# Patient Record
Sex: MALE | Race: WHITE | NOT HISPANIC OR LATINO | URBAN - METROPOLITAN AREA
[De-identification: names, ages, dates, MRNs, and addresses within clinical notes are randomized per-mention and may not be internally consistent; named-entity substitution may affect disease eponyms.]

---

## 2017-02-15 ENCOUNTER — OFFICE VISIT - RIVER FALLS (OUTPATIENT)
Dept: FAMILY MEDICINE | Facility: CLINIC | Age: 67
End: 2017-02-15

## 2017-02-15 ASSESSMENT — MIFFLIN-ST. JEOR: SCORE: 1888.23

## 2017-02-16 ENCOUNTER — OFFICE VISIT - RIVER FALLS (OUTPATIENT)
Dept: FAMILY MEDICINE | Facility: CLINIC | Age: 67
End: 2017-02-16

## 2017-03-17 ENCOUNTER — OFFICE VISIT - RIVER FALLS (OUTPATIENT)
Dept: FAMILY MEDICINE | Facility: CLINIC | Age: 67
End: 2017-03-17

## 2017-03-17 ASSESSMENT — MIFFLIN-ST. JEOR: SCORE: 1888.23

## 2017-04-04 ENCOUNTER — OFFICE VISIT - RIVER FALLS (OUTPATIENT)
Dept: FAMILY MEDICINE | Facility: CLINIC | Age: 67
End: 2017-04-04

## 2017-04-04 ASSESSMENT — MIFFLIN-ST. JEOR: SCORE: 1892.77

## 2017-05-09 ENCOUNTER — COMMUNICATION - RIVER FALLS (OUTPATIENT)
Dept: FAMILY MEDICINE | Facility: CLINIC | Age: 67
End: 2017-05-09

## 2017-05-09 ENCOUNTER — OFFICE VISIT - RIVER FALLS (OUTPATIENT)
Dept: FAMILY MEDICINE | Facility: CLINIC | Age: 67
End: 2017-05-09

## 2017-05-09 ASSESSMENT — MIFFLIN-ST. JEOR: SCORE: 1861.02

## 2017-05-10 LAB
CREAT SERPL-MCNC: 0.91 MG/DL (ref 0.7–1.25)
GLUCOSE BLD-MCNC: 145 MG/DL (ref 65–99)

## 2017-05-22 ENCOUNTER — OFFICE VISIT - RIVER FALLS (OUTPATIENT)
Dept: FAMILY MEDICINE | Facility: CLINIC | Age: 67
End: 2017-05-22

## 2017-05-22 ASSESSMENT — MIFFLIN-ST. JEOR: SCORE: 1861.02

## 2017-06-20 ENCOUNTER — COMMUNICATION - RIVER FALLS (OUTPATIENT)
Dept: FAMILY MEDICINE | Facility: CLINIC | Age: 67
End: 2017-06-20

## 2017-06-20 ENCOUNTER — OFFICE VISIT - RIVER FALLS (OUTPATIENT)
Dept: FAMILY MEDICINE | Facility: CLINIC | Age: 67
End: 2017-06-20

## 2017-06-20 ASSESSMENT — MIFFLIN-ST. JEOR: SCORE: 1847.41

## 2017-06-21 LAB — HBA1C MFR BLD: 6.9 %

## 2018-03-22 ENCOUNTER — OFFICE VISIT - RIVER FALLS (OUTPATIENT)
Dept: FAMILY MEDICINE | Facility: CLINIC | Age: 68
End: 2018-03-22
Payer: MEDICARE

## 2018-03-22 ASSESSMENT — MIFFLIN-ST. JEOR: SCORE: 1870.09

## 2018-03-23 LAB
CHOLEST SERPL-MCNC: 200 MG/DL
CHOLEST/HDLC SERPL: 7.1 {RATIO}
CREAT SERPL-MCNC: 0.97 MG/DL (ref 0.7–1.25)
GLUCOSE BLD-MCNC: 104 MG/DL (ref 65–99)
HBA1C MFR BLD: 6.4 %
HDLC SERPL-MCNC: 28 MG/DL
LDLC SERPL CALC-MCNC: 95 MG/DL
LDLC SERPL CALC-MCNC: ABNORMAL MG/DL
NONHDLC SERPL-MCNC: 172 MG/DL
TRIGL SERPL-MCNC: 549 MG/DL

## 2018-10-02 ENCOUNTER — AMBULATORY - RIVER FALLS (OUTPATIENT)
Dept: FAMILY MEDICINE | Facility: CLINIC | Age: 68
End: 2018-10-02
Payer: MEDICARE

## 2018-10-03 LAB — HBA1C MFR BLD: 6.9 %

## 2018-10-09 ENCOUNTER — OFFICE VISIT - RIVER FALLS (OUTPATIENT)
Dept: FAMILY MEDICINE | Facility: CLINIC | Age: 68
End: 2018-10-09
Payer: MEDICARE

## 2018-11-13 ENCOUNTER — OFFICE VISIT - RIVER FALLS (OUTPATIENT)
Dept: FAMILY MEDICINE | Facility: CLINIC | Age: 68
End: 2018-11-13
Payer: MEDICARE

## 2018-11-13 ASSESSMENT — MIFFLIN-ST. JEOR: SCORE: 1865.55

## 2018-11-14 LAB — PSA SERPL-MCNC: 2.5 NG/ML

## 2018-11-21 ENCOUNTER — AMBULATORY - RIVER FALLS (OUTPATIENT)
Dept: FAMILY MEDICINE | Facility: CLINIC | Age: 68
End: 2018-11-21
Payer: MEDICARE

## 2019-05-13 ENCOUNTER — AMBULATORY - RIVER FALLS (OUTPATIENT)
Dept: FAMILY MEDICINE | Facility: CLINIC | Age: 69
End: 2019-05-13
Payer: MEDICARE

## 2019-05-14 ENCOUNTER — COMMUNICATION - RIVER FALLS (OUTPATIENT)
Dept: FAMILY MEDICINE | Facility: CLINIC | Age: 69
End: 2019-05-14
Payer: MEDICARE

## 2019-05-14 LAB
BUN SERPL-MCNC: 21 MG/DL (ref 7–25)
BUN/CREAT RATIO - HISTORICAL: ABNORMAL (ref 6–22)
CALCIUM SERPL-MCNC: 9.6 MG/DL (ref 8.6–10.3)
CHLORIDE BLD-SCNC: 104 MMOL/L (ref 98–110)
CHOLEST SERPL-MCNC: 153 MG/DL
CHOLEST/HDLC SERPL: 4.3 {RATIO}
CO2 SERPL-SCNC: 27 MMOL/L (ref 20–32)
CREAT SERPL-MCNC: 0.91 MG/DL (ref 0.7–1.25)
CREAT UR-MCNC: 103 MG/DL (ref 20–320)
EGFRCR SERPLBLD CKD-EPI 2021: 88 ML/MIN/1.73M2
GLUCOSE BLD-MCNC: 140 MG/DL (ref 65–99)
HBA1C MFR BLD: 7.2 %
HDLC SERPL-MCNC: 36 MG/DL
LDLC SERPL CALC-MCNC: 80 MG/DL
MICROALBUMIN UR-MCNC: 0.3 MG/DL
MICROALBUMIN/CREAT UR: 3 MG/G{CREAT}
NONHDLC SERPL-MCNC: 117 MG/DL
POTASSIUM BLD-SCNC: 4.2 MMOL/L (ref 3.5–5.3)
SODIUM SERPL-SCNC: 140 MMOL/L (ref 135–146)
TRIGL SERPL-MCNC: 280 MG/DL

## 2019-05-22 ENCOUNTER — OFFICE VISIT - RIVER FALLS (OUTPATIENT)
Dept: FAMILY MEDICINE | Facility: CLINIC | Age: 69
End: 2019-05-22
Payer: MEDICARE

## 2019-05-22 ASSESSMENT — MIFFLIN-ST. JEOR: SCORE: 1867.37

## 2019-06-11 ENCOUNTER — OFFICE VISIT - RIVER FALLS (OUTPATIENT)
Dept: FAMILY MEDICINE | Facility: CLINIC | Age: 69
End: 2019-06-11
Payer: MEDICARE

## 2019-06-11 ASSESSMENT — MIFFLIN-ST. JEOR: SCORE: 1861.02

## 2019-10-03 ENCOUNTER — OFFICE VISIT - RIVER FALLS (OUTPATIENT)
Dept: FAMILY MEDICINE | Facility: CLINIC | Age: 69
End: 2019-10-03
Payer: MEDICARE

## 2019-10-04 ENCOUNTER — OFFICE VISIT - RIVER FALLS (OUTPATIENT)
Dept: FAMILY MEDICINE | Facility: CLINIC | Age: 69
End: 2019-10-04
Payer: MEDICARE

## 2019-10-04 ASSESSMENT — MIFFLIN-ST. JEOR: SCORE: 1857.39

## 2019-10-05 LAB — HBA1C MFR BLD: 8.4 %

## 2019-10-06 ENCOUNTER — COMMUNICATION - RIVER FALLS (OUTPATIENT)
Dept: FAMILY MEDICINE | Facility: CLINIC | Age: 69
End: 2019-10-06
Payer: MEDICARE

## 2020-01-16 ENCOUNTER — AMBULATORY - RIVER FALLS (OUTPATIENT)
Dept: FAMILY MEDICINE | Facility: CLINIC | Age: 70
End: 2020-01-16
Payer: MEDICARE

## 2020-01-16 ENCOUNTER — OFFICE VISIT - RIVER FALLS (OUTPATIENT)
Dept: FAMILY MEDICINE | Facility: CLINIC | Age: 70
End: 2020-01-16
Payer: MEDICARE

## 2020-01-16 ASSESSMENT — MIFFLIN-ST. JEOR: SCORE: 1892.77

## 2020-01-17 ENCOUNTER — COMMUNICATION - RIVER FALLS (OUTPATIENT)
Dept: FAMILY MEDICINE | Facility: CLINIC | Age: 70
End: 2020-01-17
Payer: MEDICARE

## 2020-01-17 LAB
HBA1C MFR BLD: 6.8 %
PSA SERPL-MCNC: 3 NG/ML

## 2020-06-04 ENCOUNTER — AMBULATORY - RIVER FALLS (OUTPATIENT)
Dept: FAMILY MEDICINE | Facility: CLINIC | Age: 70
End: 2020-06-04
Payer: MEDICARE

## 2020-06-05 LAB
CREAT UR-MCNC: 147 MG/DL (ref 20–320)
HBA1C MFR BLD: 7.6 %
MICROALBUMIN UR-MCNC: 0.7 MG/DL
MICROALBUMIN/CREAT UR: 5 MG/G{CREAT}

## 2020-06-08 ENCOUNTER — COMMUNICATION - RIVER FALLS (OUTPATIENT)
Dept: FAMILY MEDICINE | Facility: CLINIC | Age: 70
End: 2020-06-08
Payer: MEDICARE

## 2020-07-28 ENCOUNTER — OFFICE VISIT - RIVER FALLS (OUTPATIENT)
Dept: FAMILY MEDICINE | Facility: CLINIC | Age: 70
End: 2020-07-28
Payer: MEDICARE

## 2020-07-28 ASSESSMENT — MIFFLIN-ST. JEOR: SCORE: 1874.62

## 2021-02-25 ENCOUNTER — AMBULATORY - RIVER FALLS (OUTPATIENT)
Dept: FAMILY MEDICINE | Facility: CLINIC | Age: 71
End: 2021-02-25
Payer: MEDICARE

## 2021-02-26 ENCOUNTER — COMMUNICATION - RIVER FALLS (OUTPATIENT)
Dept: FAMILY MEDICINE | Facility: CLINIC | Age: 71
End: 2021-02-26
Payer: MEDICARE

## 2021-02-26 LAB
BUN SERPL-MCNC: 17 MG/DL (ref 7–25)
BUN/CREAT RATIO - HISTORICAL: ABNORMAL (ref 6–22)
CALCIUM SERPL-MCNC: 9.2 MG/DL (ref 8.6–10.3)
CHLORIDE BLD-SCNC: 103 MMOL/L (ref 98–110)
CHOLEST SERPL-MCNC: 133 MG/DL
CHOLEST/HDLC SERPL: 4.2 {RATIO}
CO2 SERPL-SCNC: 27 MMOL/L (ref 20–32)
CREAT SERPL-MCNC: 1.01 MG/DL (ref 0.7–1.25)
EGFRCR SERPLBLD CKD-EPI 2021: 76 ML/MIN/1.73M2
GLUCOSE BLD-MCNC: 180 MG/DL (ref 65–99)
HBA1C MFR BLD: 7.9 %
HDLC SERPL-MCNC: 32 MG/DL
LDLC SERPL CALC-MCNC: 66 MG/DL
NONHDLC SERPL-MCNC: 101 MG/DL
POTASSIUM BLD-SCNC: 4.1 MMOL/L (ref 3.5–5.3)
PSA SERPL-MCNC: 3.2 NG/ML
SODIUM SERPL-SCNC: 139 MMOL/L (ref 135–146)
TRIGL SERPL-MCNC: 263 MG/DL

## 2021-03-04 ENCOUNTER — OFFICE VISIT - RIVER FALLS (OUTPATIENT)
Dept: FAMILY MEDICINE | Facility: CLINIC | Age: 71
End: 2021-03-04
Payer: MEDICARE

## 2021-03-04 ASSESSMENT — MIFFLIN-ST. JEOR: SCORE: 1893.68

## 2021-09-16 ENCOUNTER — OFFICE VISIT - RIVER FALLS (OUTPATIENT)
Dept: FAMILY MEDICINE | Facility: CLINIC | Age: 71
End: 2021-09-16
Payer: MEDICARE

## 2021-09-24 ENCOUNTER — AMBULATORY - RIVER FALLS (OUTPATIENT)
Dept: FAMILY MEDICINE | Facility: CLINIC | Age: 71
End: 2021-09-24
Payer: MEDICARE

## 2021-09-25 LAB
BUN SERPL-MCNC: 18 MG/DL (ref 7–25)
BUN/CREAT RATIO - HISTORICAL: ABNORMAL (ref 6–22)
CALCIUM SERPL-MCNC: 9.7 MG/DL (ref 8.6–10.3)
CHLORIDE BLD-SCNC: 102 MMOL/L (ref 98–110)
CHOLEST SERPL-MCNC: 188 MG/DL
CHOLEST/HDLC SERPL: 5.7 {RATIO}
CO2 SERPL-SCNC: 27 MMOL/L (ref 20–32)
CREAT SERPL-MCNC: 1.09 MG/DL (ref 0.7–1.25)
CREAT UR-MCNC: 145 MG/DL (ref 20–320)
EGFRCR SERPLBLD CKD-EPI 2021: 69 ML/MIN/1.73M2
GLUCOSE BLD-MCNC: 157 MG/DL (ref 65–99)
HBA1C MFR BLD: 7.3 %
HDLC SERPL-MCNC: 33 MG/DL
LDLC SERPL CALC-MCNC: 107 MG/DL
MICROALBUMIN UR-MCNC: 0.4 MG/DL
MICROALBUMIN/CREAT UR: 3 MG/G{CREAT}
NONHDLC SERPL-MCNC: 155 MG/DL
POTASSIUM BLD-SCNC: 4.1 MMOL/L (ref 3.5–5.3)
SARS-COV-2 AB SERPL IA-ACNC: <1 [IU]/ML
SODIUM SERPL-SCNC: 137 MMOL/L (ref 135–146)
TRIGL SERPL-MCNC: 361 MG/DL

## 2021-09-27 ENCOUNTER — COMMUNICATION - RIVER FALLS (OUTPATIENT)
Dept: FAMILY MEDICINE | Facility: CLINIC | Age: 71
End: 2021-09-27
Payer: MEDICARE

## 2021-10-27 ENCOUNTER — AMBULATORY - RIVER FALLS (OUTPATIENT)
Dept: FAMILY MEDICINE | Facility: CLINIC | Age: 71
End: 2021-10-27

## 2021-10-27 ENCOUNTER — LAB REQUISITION (OUTPATIENT)
Dept: LAB | Facility: CLINIC | Age: 71
End: 2021-10-27
Payer: MEDICARE

## 2021-10-27 ENCOUNTER — OFFICE VISIT - RIVER FALLS (OUTPATIENT)
Dept: FAMILY MEDICINE | Facility: CLINIC | Age: 71
End: 2021-10-27

## 2021-10-27 DIAGNOSIS — U07.1 COVID-19: ICD-10-CM

## 2021-10-27 PROCEDURE — U0003 INFECTIOUS AGENT DETECTION BY NUCLEIC ACID (DNA OR RNA); SEVERE ACUTE RESPIRATORY SYNDROME CORONAVIRUS 2 (SARS-COV-2) (CORONAVIRUS DISEASE [COVID-19]), AMPLIFIED PROBE TECHNIQUE, MAKING USE OF HIGH THROUGHPUT TECHNOLOGIES AS DESCRIBED BY CMS-2020-01-R: HCPCS | Mod: ORL | Performed by: PHYSICIAN ASSISTANT

## 2021-10-28 LAB — SARS-COV-2 RNA RESP QL NAA+PROBE: NEGATIVE

## 2021-10-29 LAB — SARS-COV-2 RNA RESP QL NAA+PROBE: NEGATIVE

## 2021-11-02 ENCOUNTER — LAB REQUISITION (OUTPATIENT)
Dept: LAB | Facility: CLINIC | Age: 71
End: 2021-11-02
Payer: MEDICARE

## 2021-11-02 ENCOUNTER — AMBULATORY - RIVER FALLS (OUTPATIENT)
Dept: FAMILY MEDICINE | Facility: CLINIC | Age: 71
End: 2021-11-02

## 2021-11-02 DIAGNOSIS — U07.1 COVID-19: ICD-10-CM

## 2021-11-02 PROCEDURE — U0003 INFECTIOUS AGENT DETECTION BY NUCLEIC ACID (DNA OR RNA); SEVERE ACUTE RESPIRATORY SYNDROME CORONAVIRUS 2 (SARS-COV-2) (CORONAVIRUS DISEASE [COVID-19]), AMPLIFIED PROBE TECHNIQUE, MAKING USE OF HIGH THROUGHPUT TECHNOLOGIES AS DESCRIBED BY CMS-2020-01-R: HCPCS | Mod: ORL | Performed by: FAMILY MEDICINE

## 2021-11-05 ENCOUNTER — COMMUNICATION - RIVER FALLS (OUTPATIENT)
Dept: FAMILY MEDICINE | Facility: CLINIC | Age: 71
End: 2021-11-05
Payer: MEDICARE

## 2021-11-05 LAB — SARS-COV-2 RNA RESP QL NAA+PROBE: POSITIVE

## 2021-11-06 LAB — SARS-COV-2 RNA RESP QL NAA+PROBE: DETECTED

## 2022-02-12 VITALS
WEIGHT: 248 LBS | DIASTOLIC BLOOD PRESSURE: 92 MMHG | HEART RATE: 76 BPM | BODY MASS INDEX: 38.27 KG/M2 | SYSTOLIC BLOOD PRESSURE: 138 MMHG

## 2022-02-12 VITALS
SYSTOLIC BLOOD PRESSURE: 132 MMHG | DIASTOLIC BLOOD PRESSURE: 78 MMHG | SYSTOLIC BLOOD PRESSURE: 123 MMHG | TEMPERATURE: 97.8 F | BODY MASS INDEX: 38.64 KG/M2 | HEIGHT: 68 IN | DIASTOLIC BLOOD PRESSURE: 70 MMHG | HEART RATE: 76 BPM | TEMPERATURE: 98.2 F | OXYGEN SATURATION: 97 % | WEIGHT: 248.2 LBS | HEART RATE: 76 BPM | BODY MASS INDEX: 37.62 KG/M2 | HEIGHT: 68 IN

## 2022-02-12 VITALS
TEMPERATURE: 97.5 F | DIASTOLIC BLOOD PRESSURE: 76 MMHG | SYSTOLIC BLOOD PRESSURE: 120 MMHG | HEART RATE: 75 BPM | BODY MASS INDEX: 37.28 KG/M2 | HEIGHT: 68 IN | WEIGHT: 246 LBS

## 2022-02-12 VITALS
HEIGHT: 68 IN | HEART RATE: 76 BPM | HEIGHT: 68 IN | BODY MASS INDEX: 38.65 KG/M2 | DIASTOLIC BLOOD PRESSURE: 86 MMHG | SYSTOLIC BLOOD PRESSURE: 120 MMHG | HEART RATE: 82 BPM | SYSTOLIC BLOOD PRESSURE: 128 MMHG | WEIGHT: 249 LBS | BODY MASS INDEX: 37.74 KG/M2 | WEIGHT: 249 LBS | TEMPERATURE: 98 F | HEIGHT: 68 IN | TEMPERATURE: 98.2 F | WEIGHT: 256 LBS | HEIGHT: 68 IN | WEIGHT: 255 LBS | BODY MASS INDEX: 37.74 KG/M2 | DIASTOLIC BLOOD PRESSURE: 82 MMHG | BODY MASS INDEX: 38.8 KG/M2

## 2022-02-12 VITALS
SYSTOLIC BLOOD PRESSURE: 128 MMHG | BODY MASS INDEX: 38.65 KG/M2 | HEIGHT: 68 IN | WEIGHT: 255 LBS | HEART RATE: 88 BPM | DIASTOLIC BLOOD PRESSURE: 86 MMHG | TEMPERATURE: 98.3 F

## 2022-02-12 VITALS
WEIGHT: 251 LBS | HEIGHT: 68 IN | DIASTOLIC BLOOD PRESSURE: 80 MMHG | RESPIRATION RATE: 16 BRPM | BODY MASS INDEX: 38.04 KG/M2 | SYSTOLIC BLOOD PRESSURE: 128 MMHG | TEMPERATURE: 97.1 F | HEART RATE: 64 BPM

## 2022-02-12 VITALS
BODY MASS INDEX: 38.8 KG/M2 | OXYGEN SATURATION: 97 % | DIASTOLIC BLOOD PRESSURE: 82 MMHG | TEMPERATURE: 97.7 F | WEIGHT: 256 LBS | HEIGHT: 68 IN | SYSTOLIC BLOOD PRESSURE: 150 MMHG | HEART RATE: 70 BPM

## 2022-02-12 VITALS
HEART RATE: 79 BPM | WEIGHT: 252 LBS | DIASTOLIC BLOOD PRESSURE: 80 MMHG | BODY MASS INDEX: 37.96 KG/M2 | BODY MASS INDEX: 38.19 KG/M2 | TEMPERATURE: 98.8 F | HEIGHT: 68 IN | HEIGHT: 68 IN | SYSTOLIC BLOOD PRESSURE: 129 MMHG | SYSTOLIC BLOOD PRESSURE: 130 MMHG | DIASTOLIC BLOOD PRESSURE: 82 MMHG

## 2022-02-12 VITALS
WEIGHT: 250.4 LBS | BODY MASS INDEX: 37.89 KG/M2 | SYSTOLIC BLOOD PRESSURE: 136 MMHG | DIASTOLIC BLOOD PRESSURE: 72 MMHG | HEART RATE: 67 BPM | SYSTOLIC BLOOD PRESSURE: 136 MMHG | TEMPERATURE: 98.3 F | DIASTOLIC BLOOD PRESSURE: 67 MMHG | BODY MASS INDEX: 38.64 KG/M2 | WEIGHT: 250 LBS | HEIGHT: 68 IN | HEART RATE: 72 BPM

## 2022-02-12 VITALS
SYSTOLIC BLOOD PRESSURE: 144 MMHG | HEIGHT: 68 IN | HEART RATE: 77 BPM | DIASTOLIC BLOOD PRESSURE: 74 MMHG | TEMPERATURE: 97.3 F | WEIGHT: 256.2 LBS | BODY MASS INDEX: 38.83 KG/M2

## 2022-02-12 VITALS
WEIGHT: 250.4 LBS | DIASTOLIC BLOOD PRESSURE: 77 MMHG | HEIGHT: 68 IN | BODY MASS INDEX: 37.95 KG/M2 | HEART RATE: 69 BPM | SYSTOLIC BLOOD PRESSURE: 126 MMHG | TEMPERATURE: 97.4 F

## 2022-02-12 VITALS — BODY MASS INDEX: 37.74 KG/M2 | WEIGHT: 249 LBS | HEIGHT: 68 IN

## 2022-02-15 NOTE — NURSING NOTE
Comprehensive Intake Entered On:  5/22/2019 10:00 AM CDT    Performed On:  5/22/2019 9:58 AM CDT by Concepción Hilliard               Summary   Chief Complaint :   DM check    Advance Directive :   No   Weight Measured :   250.4 lb(Converted to: 250 lb 6 oz, 113.58 kg)    Height Measured :   67.5 in(Converted to: 5 ft 7 in, 171.45 cm)    Body Mass Index :   38.64 kg/m2 (HI)    Body Surface Area :   2.32 m2   Systolic Blood Pressure :   126 mmHg   Diastolic Blood Pressure :   77 mmHg   Mean Arterial Pressure :   93 mmHg   Peripheral Pulse Rate :   69 bpm   BP Method :   Electronic   HR Method :   Electronic   Temperature Tympanic :   97.4 DegF(Converted to: 36.3 DegC)  (LOW)    Concepción Hilliard - 5/22/2019 9:58 AM CDT   Health Status   Allergies Verified? :   Yes   Medication History Verified? :   Yes   Immunizations Current :   Other: Declines flu shot   Pre-Visit Planning Status :   Completed   Tobacco Use? :   Former smoker   Concepción Hilliard - 5/22/2019 9:58 AM CDT   Meds / Allergies   (As Of: 5/22/2019 10:00:27 AM CDT)   Allergies (Active)   No Known Medication Allergies  Estimated Onset Date:   Unspecified ; Created By:   Tala Borden MA; Reaction Status:   Active ; Category:   Drug ; Substance:   No Known Medication Allergies ; Type:   Allergy ; Updated By:   Tala Borden MA; Reviewed Date:   11/13/2018 10:32 AM CST        Medication List   (As Of: 5/22/2019 10:00:27 AM CDT)   Prescription/Discharge Order    Miscellaneous Rx Supply  :   Miscellaneous Rx Supply ; Status:   Completed ; Ordered As Mnemonic:   AutoPAP +4-20cm H2o ; Simple Display Line:   See Instructions, Heated humidifier, heated tubing, filters, nasal or full face mask of choice with headgear.  Replacement cushions and supplies as needed.  Months = 99 (Lifetime), 1 EA, 0 Refill(s) ; Ordering Provider:   Emmett Langford MD; Catalog Code:   Miscellaneous Rx Supply ; Order Dt/Tm:   11/13/2018 11:01:06 AM          tamsulosin  :   tamsulosin ;  Status:   Prescribed ; Ordered As Mnemonic:   tamsulosin 0.4 mg oral capsule ; Simple Display Line:   0.4 mg, 1 cap(s), PO, Daily, 90 cap(s), 3 Refill(s) ; Ordering Provider:   Emmett Langford MD; Catalog Code:   tamsulosin ; Order Dt/Tm:   11/13/2018 11:00:12 AM          dulaglutide  :   dulaglutide ; Status:   Prescribed ; Ordered As Mnemonic:   Trulicity Pen 1.5 mg/0.5 mL subcutaneous solution ; Simple Display Line:   1.5 mg, Subcutaneous, qweek, INJECT ONE PEN SUBCUTANEOUSLY ONCE WEEKLY AS DIRECTED, 2 mL, 6 Refill(s) ; Ordering Provider:   Victor M Gibbs MD; Catalog Code:   dulaglutide ; Order Dt/Tm:   10/10/2018 3:08:24 PM          atorvastatin  :   atorvastatin ; Status:   Prescribed ; Ordered As Mnemonic:   atorvastatin 10 mg oral tablet ; Simple Display Line:   10 mg, 1 tab(s), po, qhs, 90 tab(s), 1 Refill(s) ; Ordering Provider:   Victor M Gibbs MD; Catalog Code:   atorvastatin ; Order Dt/Tm:   10/9/2018 3:04:43 PM          Miscellaneous Prescription  :   Miscellaneous Prescription ; Status:   Prescribed ; Ordered As Mnemonic:   accu check smart view test strips ; Simple Display Line:   See Instructions, testing 2x/ day, 200 EA, 3 Refill(s) ; Ordering Provider:   Victor M Gibbs MD; Catalog Code:   Miscellaneous Prescription ; Order Dt/Tm:   3/22/2018 2:16:54 PM          Miscellaneous Prescription  :   Miscellaneous Prescription ; Status:   Prescribed ; Ordered As Mnemonic:   accu check fast clix lancets ; Simple Display Line:   See Instructions, testing 2x/ day, 100 EA, 1 Refill(s) ; Ordering Provider:   Victor M Gibbs MD; Catalog Code:   Miscellaneous Prescription ; Order Dt/Tm:   3/22/2018 2:16:19 PM            Home Meds    aspirin  :   aspirin ; Status:   Completed ; Ordered As Mnemonic:   aspirin 81 mg oral tablet ; Simple Display Line:   81 mg, 1 tab(s), po, daily, 0 Refill(s) ; Catalog Code:   aspirin ; Order Dt/Tm:   5/9/2017 11:23:59 AM

## 2022-02-15 NOTE — NURSING NOTE
Comprehensive Intake Entered On:  10/3/2019 1:55 PM CDT    Performed On:  10/3/2019 1:49 PM CDT by Liza Morillo               Summary   Chief Complaint :   Patient here to get updated on CPAP  and CPAP equipment    Advance Directive :   No   Height Measured :   67.5 in(Converted to: 5 ft 7 in, 171.45 cm)    Systolic Blood Pressure :   132 mmHg (HI)    Diastolic Blood Pressure :   70 mmHg   Mean Arterial Pressure :   91 mmHg   Peripheral Pulse Rate :   76 bpm   BP Site :   Right arm   BP Method :   Manual   HR Method :   Electronic   Temperature Tympanic :   97.8 DegF(Converted to: 36.6 DegC)  (LOW)    Oxygen Saturation :   97 %   Liza Morillo - 10/3/2019 1:49 PM CDT   Health Status   Allergies Verified? :   Yes   Medication History Verified? :   Yes   Immunizations Current :   Other: Declines flu shot   Medical History Verified? :   Yes   Pre-Visit Planning Status :   Completed   Tobacco Use? :   Former smoker   Liza Morillo - 10/3/2019 1:49 PM CDT   Consents   Consent for Immunization Exchange :   Consent Granted   Consent for Immunizations to Providers :   Consent Granted   Liza Morillo - 10/3/2019 1:49 PM CDT   Meds / Allergies   (As Of: 10/3/2019 1:55:43 PM CDT)   Allergies (Active)   No Known Medication Allergies  Estimated Onset Date:   Unspecified ; Created By:   Tala Borden MA; Reaction Status:   Active ; Category:   Drug ; Substance:   No Known Medication Allergies ; Type:   Allergy ; Updated By:   Tala Borden MA; Reviewed Date:   10/3/2019 1:52 PM CDT        Medication List   (As Of: 10/3/2019 1:55:43 PM CDT)   Prescription/Discharge Order    atorvastatin  :   atorvastatin ; Status:   Prescribed ; Ordered As Mnemonic:   atorvastatin 10 mg oral tablet ; Simple Display Line:   10 mg, 1 tab(s), po, qhs, 90 tab(s), 1 Refill(s) ; Ordering Provider:   Victor M Gibbs MD; Catalog Code:   atorvastatin ; Order Dt/Tm:   5/22/2019 12:16:00 PM          dulaglutide  :   dulaglutide ;  Status:   Processing ; Ordered As Mnemonic:   Trulicity Pen 1.5 mg/0.5 mL subcutaneous solution ; Ordering Provider:   Victor M Gibbs MD; Action Display:   Complete ; Catalog Code:   dulaglutide ; Order Dt/Tm:   10/3/2019 1:52:59 PM          tamsulosin  :   tamsulosin ; Status:   Prescribed ; Ordered As Mnemonic:   tamsulosin 0.4 mg oral capsule ; Simple Display Line:   0.4 mg, 1 cap(s), PO, Daily, 90 cap(s), 1 Refill(s) ; Ordering Provider:   Victor M Gibbs MD; Catalog Code:   tamsulosin ; Order Dt/Tm:   5/22/2019 12:15:57 PM          Miscellaneous Prescription  :   Miscellaneous Prescription ; Status:   Prescribed ; Ordered As Mnemonic:   accu check smart view test strips ; Simple Display Line:   See Instructions, testing 2x/ day, 200 EA, 3 Refill(s) ; Ordering Provider:   Victor M Gibbs MD; Catalog Code:   Miscellaneous Prescription ; Order Dt/Tm:   3/22/2018 2:16:54 PM          Miscellaneous Prescription  :   Miscellaneous Prescription ; Status:   Prescribed ; Ordered As Mnemonic:   accu check fast clix lancets ; Simple Display Line:   See Instructions, testing 2x/ day, 100 EA, 1 Refill(s) ; Ordering Provider:   Victor M Gibbs MD; Catalog Code:   Miscellaneous Prescription ; Order Dt/Tm:   3/22/2018 2:16:19 PM

## 2022-02-15 NOTE — LETTER
(Inserted Image. Unable to display)   December 04, 2019        DAVID SULLIVAN  [address]   [place], [place] [id number]        Dear ,      Thank you for selecting [facility] for your healthcare needs.    Our records indicate you are due for the following services:     Sleep Apnea Follow up ~ It is recommended and possibly required by your insurance to see one of our sleep physicians, Dr. Donald Langford or Dr. León Ocampo, 30-90 days after starting treatment (PAP therapy) for sleep apnea.  To determine whether you are benefiting from PAP therapy, a download of your machine will be needed.  We may be able to obtain the download remotely; however, to ensure this information will be available for your visit, please bring your CPAP machine and SD card to your visit.      Appointments with our sleep physicians can be made by calling your primary clinic.        To schedule an appointment or if you have further questions, please contact your primary clinic:   [facility]       [phone]   [facility]       [phone]              [facility]     [phone]      Powered by [employer]    Sincerely,    Emmett Langford M.D., FACP

## 2022-02-15 NOTE — LETTER
(Inserted Image. Unable to display)   February 26, 2021      DAVID SULLIVAN      [address] [place], [place] [id number]        Dear ,    Thank you for selecting [facility] for your healthcare needs.  Below you will find the results of the recent tests done at our clinic.     Your A1C is going up. I will increase your Trulicity to 3 mg at the pharmacy.      Result Name Current Result Previous Result Reference Range   Potassium Level (mmol/L)  4.1 2/25/2021  4.2 1/16/2020 3.5 - 5.3   Glucose Level (mg/dL) ((H)) 180 2/25/2021 ((H)) 111 1/16/2020 65 - 99   Creatinine Level (mg/dL)  1.01 2/25/2021  0.92 1/16/2020 0.70 - 1.25   Hgb A1c ((H)) 7.9 2/25/2021 ((H)) 7.6 6/4/2020  - <5.7   Cholesterol (mg/dL)  133 2/25/2021  165 1/16/2020  - <200   HDL (mg/dL) ((L)) 32 2/25/2021 ((L)) 29 1/16/2020 > OR = 40 -    LDL  66 2/25/2021  See comment 1/16/2020    Triglyceride (mg/dL) ((H)) 263 2/25/2021 ((H)) 444 1/16/2020  - <150   PSA (ng/mL)  3.2 2/25/2021  3.0 1/16/2020  - < OR = 4.0       Please contact me or my assistant at [phone] if you have any questions.     Sincerely,        Victor M Gibbs MD        What do your labs mean?  Below is a glossary of commonly ordered labs:  LDL   Bad Cholesterol   HDL   Good Cholesterol  AST/ALT   Liver Function   Cr/Creatinine   Kidney Function  Microalbumin   Kidney Function  BUN   Kidney Function  PSA   Prostate    TSH   Thyroid Hormone  HgbA1c   Diabetes Test   Hgb (Hemoglobin)   Red Blood Cells

## 2022-02-15 NOTE — LETTER
(Inserted Image. Unable to display)   January 17, 2020      DAVID SULLIVAN      [address] [place], [place] [id number]        Dear ,    Thank you for selecting [facility] for your healthcare needs.  Below you will find the results of the recent tests done at our clinic.    All labs acceptable.      Result Name Current Result Previous Result Reference Range   Potassium Level (mmol/L)  4.2 1/16/2020  4.2 5/13/2019 3.5 - 5.3   Glucose Level (mg/dL) ((H)) 111 1/16/2020 ((H)) 140 5/13/2019 65 - 99   Creatinine Level (mg/dL)  0.92 1/16/2020  0.91 5/13/2019 0.70 - 1.25   Cholesterol (mg/dL)  165 1/16/2020  153 5/13/2019  - <200   HDL (mg/dL) ((L)) 29 1/16/2020 ((L)) 36 5/13/2019 >40 -    LDL Direct (mg/dL)  81 1/16/2020  95 3/22/2018  - <100   TSH (mIU/L)  3.50 1/16/2020  0.40 - 4.50   PSA (ng/mL)  3.0 1/16/2020  2.5 11/13/2018  - < OR = 4.0       Please contact me or my assistant at [phone] if you have any questions.     Sincerely,        Victor M Gibbs MD        What do your labs mean?  Below is a glossary of commonly ordered labs:  LDL   Bad Cholesterol   HDL   Good Cholesterol  AST/ALT   Liver Function   Cr/Creatinine   Kidney Function  Microalbumin   Kidney Function  BUN   Kidney Function  PSA   Prostate    TSH   Thyroid Hormone  HgbA1c   Diabetes Test   Hgb (Hemoglobin)   Red Blood Cells

## 2022-02-15 NOTE — TELEPHONE ENCOUNTER
---------------------  From: Emmett Langford MD   To: Sleep Message Pool ([id number] - [place]);     Sent: 10/3/2019 2:13:52 PM CDT  Subject: General Message     Compliance report in 2 months.reminder created

## 2022-02-15 NOTE — TELEPHONE ENCOUNTER
---------------------  From: Monique Rice (Sleep Message Pool ([id number]_[facility]))   To: Concepción Hilliard;     Sent: 3/4/2021 12:35:09 PM CST  Subject: RE: General Message     A pressure setting would need to be added to the RX as well as refills.    Looks like he had a pressure setting of - Auto Titrating 4-20cm - when he was set up back in 2018.      ---------------------  From: Concepción Hilliard   To: Sleep Message Pool ([id number] - [place]);     Sent: 3/4/2021 11:30:40 AM CST  Subject: General Message     placed rx for new CPAP machine---------------------  From: Concepción Hilliard   To: TFS Message Pool ([id number]);     Sent: 3/5/2021 6:32:25 AM CST  Subject: FW: General Message---------------------  From: Concepción Hilliard (TFS Message Pool ([id number]_[facility]))   To: Sleep Message Pool ([id number]);     Sent: 3/5/2021 9:10:38 AM CST  Subject: FW: General Message     I added Auto titrating 4-20cmPatient's information has been faxed to [employer] (formerly [employer]). Patient had been getting equipment through them.

## 2022-02-15 NOTE — NURSING NOTE
Comprehensive Intake Entered On:  1/16/2020 11:01 AM CST    Performed On:  1/16/2020 10:53 AM CST by Ambika Gill CMA               Summary   Chief Complaint :   Patient would like to discuss the Glipizide. C/o weakness and fatigue. Requesting Trulicity   Advance Directive :   No   Weight Measured :   256 lb(Converted to: 256 lb 0 oz, 116.12 kg)    Height Measured :   67.5 in(Converted to: 5 ft 7 in, 171.45 cm)    Body Mass Index :   39.5 kg/m2 (HI)    Body Surface Area :   2.35 m2   Systolic Blood Pressure :   150 mmHg (HI)    Diastolic Blood Pressure :   82 mmHg (HI)    Mean Arterial Pressure :   105 mmHg   Peripheral Pulse Rate :   70 bpm   BP Site :   Left arm   BP Method :   Electronic   Temperature Tympanic :   97.7 DegF(Converted to: 36.5 DegC)  (LOW)    Oxygen Saturation :   97 %   Ambika Gill CMA - 1/16/2020 10:53 AM CST   Health Status   Allergies Verified? :   Yes   Medication History Verified? :   Yes   Immunizations Current :   Other: Declines flu shot   Medical History Verified? :   Yes   Pre-Visit Planning Status :   Completed   Tobacco Use? :   Former smoker   Ambika Gill CMA - 1/16/2020 10:53 AM CST   Consents   Consent for Immunization Exchange :   Consent Granted   Consent for Immunizations to Providers :   Consent Granted   Ambika Gill CMA - 1/16/2020 10:53 AM CST   Meds / Allergies   (As Of: 1/16/2020 11:01:31 AM CST)   Allergies (Active)   No Known Medication Allergies  Estimated Onset Date:   Unspecified ; Created By:   aTla Borden MA; Reaction Status:   Active ; Category:   Drug ; Substance:   No Known Medication Allergies ; Type:   Allergy ; Updated By:   Tala Borden MA; Reviewed Date:   1/16/2020 10:57 AM CST        Medication List   (As Of: 1/16/2020 11:01:31 AM CST)   Prescription/Discharge Order    atorvastatin  :   atorvastatin ; Status:   Prescribed ; Ordered As Mnemonic:   atorvastatin 10 mg oral tablet ; Simple Display Line:   10 mg, 1 tab(s), po, qhs, 90 tab(s), 1  Refill(s) ; Ordering Provider:   Victor M Gibbs MD; Catalog Code:   atorvastatin ; Order Dt/Tm:   10/4/2019 2:24:24 PM CDT          glipiZIDE  :   glipiZIDE ; Status:   Prescribed ; Ordered As Mnemonic:   glipiZIDE 5 mg oral tablet, extended release ; Simple Display Line:   5 mg, 1 tab(s), Oral, daily, 90 tab(s), 2 Refill(s) ; Ordering Provider:   Victor M Gibbs MD; Catalog Code:   glipiZIDE ; Order Dt/Tm:   10/4/2019 1:48:07 PM CDT          Miscellaneous Prescription  :   Miscellaneous Prescription ; Status:   Prescribed ; Ordered As Mnemonic:   accu check fast clix lancets ; Simple Display Line:   See Instructions, testing 2x/ day, 100 EA, 1 Refill(s) ; Ordering Provider:   Victor M Gibbs MD; Catalog Code:   Miscellaneous Prescription ; Order Dt/Tm:   3/22/2018 2:16:19 PM CDT          Miscellaneous Prescription  :   Miscellaneous Prescription ; Status:   Prescribed ; Ordered As Mnemonic:   accu check smart view test strips ; Simple Display Line:   See Instructions, testing 2x/ day, 200 EA, 3 Refill(s) ; Ordering Provider:   Victor M Gibbs MD; Catalog Code:   Miscellaneous Prescription ; Order Dt/Tm:   3/22/2018 2:16:54 PM CDT          Miscellaneous Rx Supply  :   Miscellaneous Rx Supply ; Status:   Prescribed ; Ordered As Mnemonic:   Auto Titrating CPAP 6-16 for daily use ; Simple Display Line:   See Instructions, Heated humidifier x1; Humidifier chamber x1;  Heated tubing x1; Full face mask of choice with headgear  x1; Cushion x 1;  Filters: Disposable x1pk & Reusable x1pk.  Length of Need = 99 Months, 1 EA, 11 Refill(s) ; Ordering Provider:   Emmett Langford MD; Catalog Code:   Miscellaneous Rx Supply ; Order Dt/Tm:   10/29/2019 9:56:18 AM CDT          tamsulosin  :   tamsulosin ; Status:   Prescribed ; Ordered As Mnemonic:   tamsulosin 0.4 mg oral capsule ; Simple Display Line:   0.4 mg, 1 cap(s), PO, Daily, 90 cap(s), 1 Refill(s) ; Ordering Provider:   Victor M Gibbs MD; Catalog Code:    tamsulosin ; Order Dt/Tm:   10/4/2019 2:24:22 PM CDT

## 2022-02-15 NOTE — TELEPHONE ENCOUNTER
---------------------  From: Julieth MIDDLETONTracey   Sent: 11/2/2021 3:55:28 PM CDT  Subject: [facility] Testing     Pt was seen at [facility] today for covid testing per Dr. Ocampo. 02 Sat=93%. Pt resides in [facility]-will notifiy [facility] if positive.

## 2022-02-15 NOTE — PROGRESS NOTES
Patient:   DAVID SULLIVAN            MRN: [id number]            FIN: [id number]               Age:   66 years     Sex:  Male     :  1950   Associated Diagnoses:   None   Author:   Abdelrahman Lara MD      Visit Information      Date of Service: 2017 11:13 am  Performing Location: [facility]  Encounter#: [id number]      Primary Care Provider (PCP):  Abdelrahman Lara MD    NPI# [id number]      Referring Provider:  No referring provider recorded for selected visit.      Chief Complaint   2017 11:20 AM CDT    Pt c/o side effects from metformin. Has been dizzy and nausea after taking meds.        History of Present Illness   CC as above and reviewed w  patient   Pt has been on trulicity and metformin for diabetes  - notes weakness, dizziness, nausea, and sometimes diarrhea; has been going on since he started the meds so about 2 months  - symptoms seem to occur right after starting metformin, within half an hour, symptoms last about an hour  - symptoms seem to be ameloriated by having something to eat like a banana  - blood sugars have been well controlled  otherwise  - has been more active lately      Review of Systems   Constitutional:  No fever, No chills.    Cardiovascular:  Palpitations, No chest pain.             Health Status   Allergies:    Allergic Reactions (Selected)  No known allergies   Medications:  (Selected)   Prescriptions  Prescribed  Trulicity Pen 1.5 mg/0.5 mL subcutaneous solution: ( 1.5 mg ), subcutaneous, qweek, Instructions: weekly injection   rotate injection sites, # 4 EA, 3 Refill(s), Type: Maintenance, Pharmacy: [id number], pt will have discount card, 1.5 mg subcutaneous qweek,Instr:weekly injection ; rotate in...  accu check fast clix lancets: accu check fast clix lancets, See Instructions, Instructions: testing 2x/ day, Supply, # 1 box(es), 3 Refill(s), Type: Maintenance, Pharmacy: [id number], testing 2x/ day  accu check smart view test  strips: accu check smart view test strips, See Instructions, Instructions: testing 2x/ day, Supply, # 200 EA, 3 Refill(s), Type: Maintenance, Pharmacy: [id number], testing 2x/ day  metFORMIN 500 mg oral tablet, extended release: 4 tab(s) ( 2,000 mg ), PO, Daily, Instructions: increasing weekly to reach 4 tabs   with evening meal, # 360 tab(s), 0 Refill(s), Type: Maintenance, Pharmacy: [id number], 4 tab(s) po daily,Instr:increasing weekly to reach 4 tabs ; with even...  Documented Medications  Documented  aspirin 81 mg oral tablet: 1 tab(s) ( 81 mg ), po, daily, 0 Refill(s), Type: Maintenance   Problem list:    All Problems (Selected)  History of chicken pox / SNOMED CT 946328825 / Confirmed  History of colon polyps / SNOMED CT 4087629175 / Confirmed  Hypertriglyceridemia / SNOMED CT 250969731 / Confirmed  Morbid Obesity / ICD-9-.01 / Probable  Diabetes mellitus, type II / SNOMED CT 545685058 / Confirmed      Histories   Past Medical History:    Active  Diabetes mellitus, type II (699316625): Onset on 2010 at 59 years.  History of colon polyps (9046166610)  Hypertriglyceridemia (551932967)  History of chicken pox (354295602)  Resolved  History of fracture of hand (0105627428): Onset in  at 42 years.  Resolved.  Comments:  2012 CDT 11:09 AM CDT - [facility] , Susan Boxer's, right.   Family History:    Prostate carcinoma  Brother (Naveen)  Diabetes mellitus  Uncle (M)  Comments:  2012 11:03 AM - Filomena Gracia  X 2 uncles  Breast cancer  Sister (Pat, )  CA - Cancer of colon  Father ()  CAD - Coronary artery disease  Mother ()     Procedure history:    Colonoscopy (802532556) in  at 59 Years.  Colonoscopy (412582400) in  at 53 Years.  Tonsillectomy (159578131).  sebaceous cyst removal.  ganglion cyst removal.   Social History:        Alcohol Assessment: Past            Past      Tobacco Assessment: Past            Former smoker      Substance  "Abuse Assessment: Denies Substance Abuse            Never      Employment and Education Assessment            Retired, Work/School description: [employer] (Part time).      Home and Environment Assessment            Marital status: .  Spouse/Partner name: Romina Arboleda.  Lives with Spouse.  1 children.      Nutrition and Health Assessment            Type of diet: Regular.      Exercise and Physical Activity Assessment: Regular exercise            Exercise frequency: \"Activities at work 5x/week\".      Sexual Assessment            Sexually active: Yes.  Sexual orientation: Heterosexual.        Physical Examination   Vital Signs   5/9/2017 11:20 AM CDT Temperature Tympanic 98.0 DegF    Peripheral Pulse Rate 76 bpm    Systolic Blood Pressure 128 mmHg    Diastolic Blood Pressure 82 mmHg    Mean Arterial Pressure 97 mmHg      Measurements from flowsheet : Measurements   5/9/2017 11:20 AM CDT Height Measured - Standard 67.5 in    Weight Measured - Standard 249 lb    BSA 2.32 m2    Body Mass Index 38.42 kg/m2      Neck:  No thyromegaly.    Respiratory:  Lungs are clear to auscultation, Respirations are non-labored, Breath sounds are equal.    Cardiovascular:  Normal rate, Regular rhythm, No murmur.    Integumentary:  Warm, No rash, normal capillary refill.       Review / Management   Results review:  Lab results   3/17/2017 2:17 PM CDT Glucose Level 235 mg/dL  HI    UA Color YELLOW    UA Appear CLEAR    UA pH < OR = 5.0    UA Specific Gravity 1.026    UA Glucose 3+    UA Bilirubin NEGATIVE    UA Ketones NEGATIVE    UA Blood NEGATIVE    UA Protein NEGATIVE    UA Nitrite NEGATIVE    UA Leuk Est NEGATIVE    Lyme Ab IgG/IgM West Blot <0.90   2/16/2017 8:24 AM CST Sodium Level 135 mmol/L    Potassium Level 4.0 mmol/L    Chloride Level 101 mmol/L    CO2 Level 25 mmol/L    Glucose Level 245 mg/dL  HI    BUN 18 mg/dL    Creatinine 0.97 mg/dL    BUN/Creat Ratio NOT APPLICABLE    eGFR " 82 mL/min/1.73m2    eGFR African American 95 mL/min/1.73m2    Calcium Level 9.4 mg/dL    Hgb A1c 9.8  HI    Cholesterol 230 mg/dL  HI    Non-  HI    HDL 27 mg/dL  LOW    Chol/HDL Ratio 8.5  HI    LDL See comment    LDL Direct 89 mg/dL    Triglyceride 728 mg/dL  HI    U Microalbumin 0.2 mg/dL    Ur Creatinine 78 mg/dL    Ur Microalb/Creat Ratio 3   .    EKG - NSR, ? old inferior infarct.      Impression and Plan     Weakness/dizizness/nausea  - suspect based on timing, related to metformin. Stop metformin and see how he does.  - Suggested he check his bg during events to make sure he's not hypoglycemic  - EKG & basic labs today      T2d  - meter reviewed and good sugar control  - f/u with Teetee jarrell  - needs medical visit with me soon also to medically optimize him    ? old infarct on EKG will discuss next visit needs to be on statin anyway for diabetes.

## 2022-02-15 NOTE — TELEPHONE ENCOUNTER
---------------------  From: Gracia King CMA (Phone Messages Pool ([id number]))   To: TFS Message Pool ([id number]);     Sent: 3/15/2021 3:01:16 PM CDT  Subject: med questions     Phone Message    PCP:   TFS      Time of Call:  1450       Person Calling:  pt  Phone number:  [phone], ok to LM    Returned call at: _    Note:   Pt has 2 questions:    1) He picked up his Trulicity and had thought he was only supposed to increase to 3mg per 2/26/21 lab results letter that was sent to him. The directions on his box state 4.4mg. Pt just wanting clarification which dose he should be taking.    2) Also wondering if he needs to continue taking atorvastatin 10mg. His labs have been improving and since he gets side effects from it (fatigue, muscle/joint aches), he was wondering if he could stop.        Okay to wait for TFS tomorrow.      Last office visit and reason:  3/4/21 DM TFS---------------------  From: Concepción Hilliard (TFS Message Pool ([id number]_[facility]))   To: Victor M Gibbs MD;     Sent: 3/16/2021 7:12:42 AM CDT  Subject: FW: med questions---------------------  From: Victor M Gibbs MD   To: TFS Message Pool ([id number]);     Sent: 3/16/2021 8:27:14 AM CDT  Subject: RE: med questions     Trulicity should be 3mg weekly  ok to hold lipitor to see if sxs improve Call in 1 month for updateCorrected Rx sent.  Spoke with patient, notified of recommendation.  Will call in 1 month with update.---------------------  From: Gracia King CMA (TFS Message Pool ([id number]))   To: TFS Message Pool ([address] [place] [address]);     Sent: 3/16/2021 3:01:24 PM CDT  Subject: FW: med questions     Pharmacy LM at 1439 with questions in regards to Trulicity. I spoke to pharmacy at 1450.     Pt had already picked up the 4.4mg dose and cost $200. With dose change to 3mg, it will cost him another $200 copay which he is upset about. Pharmacist said it would be very  difficult for pt to try and get a 3mg dose out of 4.4mg. Pt cannot return 4.4mg dose.     Please advise pt and pharmacy best option.     [facility] # [phone]---------------------  From: Concepción Hilliard ([employer] Message Pool ([id number]_[facility]))   To: Victor M Gibbs MD;     Sent: 3/16/2021 3:03:53 PM CDT  Subject: FW: med questions---------------------  From: Victor M Gibbs MD   To: [employer] Message Pool ([id number]_[place] [facility]);     Sent: 3/16/2021 3:11:05 PM CDT  Subject: RE: med questions     It should be the 3 mgSpoke with patient, forwarded to management.Pt called today 3/17 regarding status of the above? He was told it was forwarded to management and is awaiting response.

## 2022-02-15 NOTE — LETTER
(Inserted Image. Unable to display)         January 09, 2020        DAVID SULLIVAN  [address]   [place], [place] [id number]        Dear ,    Thank you for selecting [facility] for your healthcare needs.    Our records indicate you are due for the following services:     Non-Fasting Labs    If you had your labs done at another facility or with Direct Access Lab Testing at [facility], please bring in a copy of the results to your next visit, mail a copy, or drop off a copy of your results to your Healthcare Provider.     To schedule an appointment or if you have further questions, please contact your primary clinic:   [facility]       [phone]   [facility]       [phone]              [facility]     [phone]      Powered by [employer]    Sincerely,    Victor M Gibbs MD

## 2022-02-15 NOTE — PROGRESS NOTES
Patient:   DAVID SULLIVAN            MRN: [id number]            FIN: [id number]               Age:   67 years     Sex:  Male     :  1950   Associated Diagnoses:   Diabetes mellitus, type II; History of colon polyps; Hypertriglyceridemia; Morbid Obesity   Author:   Victor M Gibbs MD      Visit Information      Date of Service: 2018 01:37 pm  Performing Location: [facility]  Encounter#: [id number]      Primary Care Provider (PCP):  NONE ,       Chief Complaint   3/22/2018 1:46 PM CDT    Pt here for a Diabetic recheck and medication refills.  Pt states he stopped taking his atorvastatin.        History of Present Illness             The patient presents for follow-up evaluation of diabetes.  The quality of the patient's diabetes is described as being unchanged from previous visit.  Exacerbating factors consist of noncompliance with diet.  Relieving factors consist of medication.  Associated symptoms consist of none.  Glucose results: within target range.  Hemoglobin A1c results: > 6% and within target range.  Additional pertinent history: last eye exam: 2018.        Review of Systems   Constitutional:  Negative.    Eye:  Negative.    Ear/Nose/Mouth/Throat:  Negative.    Respiratory:  Negative.    Cardiovascular:  Negative.    Gastrointestinal:  Negative.    Genitourinary:  Negative.    Hematology/Lymphatics:  Negative.    Endocrine:  Negative.    Immunologic:  Negative.    Musculoskeletal:  Negative.    Integumentary:  Negative.    Neurologic:  Negative.       Health Status   Allergies:    Allergic Reactions (Selected)  No known allergies   Medications:  (Selected)   Prescriptions  Prescribed  Trulicity Pen 1.5 mg/0.5 mL subcutaneous solution: ( 1.5 mg ), subcutaneous, qweek, # 2 EA, 0 Refill(s), Type: Maintenance, [id number], Patient needs to be seen  accu check fast clix lancets: accu check fast clix lancets, See Instructions, Instructions: testing 2x/ day,  Supply, # 1 box(es), 3 Refill(s), Type: Maintenance, Pharmacy: [id number], testing 2x/ day  accu check smart view test strips: accu check smart view test strips, See Instructions, Instructions: testing 2x/ day, Supply, # 200 EA, 3 Refill(s), Type: Maintenance, Pharmacy: [id number], testing 2x/ day  Documented Medications  Documented  aspirin 81 mg oral tablet: 1 tab(s) ( 81 mg ), po, daily, 0 Refill(s), Type: Maintenance,    Medications          *denotes recorded medication          accu check smart view test strips: See Instructions, testing 2x/ day, 200 EA, 3 Refill(s).          accu check fast clix lancets: See Instructions, testing 2x/ day, 1 box(es), 3 Refill(s).          *aspirin 81 mg oral tablet: 81 mg, 1 tab(s), po, daily, 0 Refill(s).          Trulicity Pen 1.5 mg/0.5 mL subcutaneous solution: 1.5 mg, subcutaneous, qweek, 2 EA, 0 Refill(s).     Problem list:    All Problems (Selected)  History of chicken pox / SNOMED CT 155300929 / Confirmed  History of colon polyps / SNOMED CT 7350825866 / Confirmed  Hypertriglyceridemia / SNOMED CT 130646160 / Confirmed  Morbid Obesity / ICD-9-.01 / Probable  Diabetes mellitus, type II / SNOMED CT 293940235 / Confirmed      Histories   Past Medical History:    Active  Diabetes mellitus, type II (407514751): Onset on 2010 at 59 years.  History of colon polyps (8281534867)  Hypertriglyceridemia (806463613)  History of chicken pox (160911280)  Resolved  History of fracture of hand (0153746520): Onset in  at 42 years.  Resolved.  Comments:  2012 CDT 11:09 AM CDT - [place] , Susan Boxer's, right.   Family History:    Prostate carcinoma  Brother (Naveen)  Diabetes mellitus  Uncle (M)  Comments:  2012 11:03 AM - Filomena Gracia  X 2 uncles  Breast cancer  Sister (Pat, )  CA - Cancer of colon  Father ()  CAD - Coronary artery disease  Mother ()     Procedure history:    Diabetic retinal eye exam (SNOMED CT  "6104826031) on 10/19/2017 at 67 Years.  Comments:  3/21/2018 6:16 PM - Teri Arpan [place]  No Diabetic retinopathy.  Myopia of both eyes with astigmatism and presbyopia  Colonoscopy (SNOMED CT 621151674) in 2009 at 59 Years.  Colonoscopy (SNOMED CT 030265962) in 2003 at 53 Years.  Tonsillectomy (SNOMED CT 803457400).  sebaceous cyst removal.  ganglion cyst removal.   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      Home and Environment Assessment            Marital status: .  Spouse/Partner name: Romina Arboleda.  Lives with Spouse.  1 children.      Nutrition and Health Assessment            Type of diet: Regular.      Exercise and Physical Activity Assessment: Regular exercise            Exercise frequency: \"Activities at work 5x/week\".      Sexual Assessment            Sexually active: Yes.  Sexual orientation: Heterosexual.        Physical Examination   Vital Signs   3/22/2018 1:46 PM CDT Temperature Tympanic 97.1 DegF  LOW    Peripheral Pulse Rate 64 bpm    Pulse Site Radial artery    Respiratory Rate 16 br/min    Systolic Blood Pressure 128 mmHg    Diastolic Blood Pressure 80 mmHg    Mean Arterial Pressure 96 mmHg    BP Site Right arm      Measurements from flowsheet : Measurements   3/22/2018 1:46 PM CDT Height Measured - Standard 67.5 in    Weight Measured - Standard 251 lb    BSA 2.33 m2    Body Mass Index 38.73 kg/m2  HI      General:  Alert and oriented, No acute distress.    Eye:  Pupils are equal, round and reactive to light, Extraocular movements are intact.    HENT:  Normocephalic, Tympanic membranes are clear, Normal hearing, Oral mucosa is moist.    Neck:  Supple, Non-tender, No carotid bruit, No " jugular venous distention, No lymphadenopathy, No thyromegaly.    Respiratory:  Lungs are clear to auscultation, Respirations are non-labored, Breath sounds are equal.    Cardiovascular:  Normal rate, Regular rhythm, No murmur, Good pulses equal in all extremities, No edema.    Gastrointestinal:  Soft, Non-tender, Non-distended, Normal bowel sounds, No organomegaly.    Musculoskeletal:  Normal range of motion, Normal strength, No tenderness, No swelling, No deformity.    Integumentary:  Warm, Dry.    Feet:  Normal by visual exam, Normal pulses, Sensation intact, By monofilament exam.    Neurologic:  Alert, Oriented, Normal sensory, Normal motor function, No focal deficits, Cranial Nerves II-XII are grossly intact, Normal deep tendon reflexes.    Psychiatric:  Cooperative, Appropriate mood & affect, Normal judgment.       Health Maintenance      Recommendations     Pending (in the next year)        OverDue           Exercise due  03/12/14  and every 1  year(s)           DM - HgbA1c due  09/20/17  and every 3  month(s)           Preventative Services due  09/27/17  and every 5  year(s)           DM - Microalbumin due  02/16/18  and every 1  year(s)           Lipid Disorders Screen (Male) due  02/16/18  and every 1  year(s)           Alcohol Misuse Screen (Male) due  02/20/18  and every 1  year(s)           Depression Screen (Male) due  02/20/18  and every 1  year(s)        Due            Abdominal Aortic Aneurysm Screen (if hx of smoking) due  03/22/18  One-time only           Fall Risk Screen (Male) due  03/22/18  and every 1  year(s)           Hepatitis C Screen [id number] (Male) due  03/22/18  One-time only           Lung Cancer Screen (Male) due  03/22/18  and every 1  year(s)        Refused            DM - Foot Exam due  03/22/18  and every 1  year(s)           Influenza Vaccine due  03/22/18  and every 1  year(s)           Zoster/Shingles Vaccine due  03/22/18  One-time only        Due In Future            DM -  Eye Exam not due until  10/19/18  and every 1  year(s)     Satisfied (in the past 1 year)        Satisfied            Aspirin Therapy for Prevention of CVD (Male) on  05/09/17.           Body Mass Index Check (Male) on  03/22/18.           Body Mass Index Check (Male) on  06/20/17.           Body Mass Index Check (Male) on  05/22/17.           Body Mass Index Check (Male) on  05/09/17.           Body Mass Index Check (Male) on  04/04/17.           DM - Eye Exam on  10/19/17.           DM - HgbA1c on  06/20/17.           High Blood Pressure Screen (Male) on  03/22/18.           High Blood Pressure Screen (Male) on  06/20/17.           High Blood Pressure Screen (Male) on  05/09/17.           Pneumococcal Vaccine on  06/20/17.           Tobacco Use Screen (Male) on  03/22/18.           Tobacco Use Screen (Male) on  06/20/17.           Tobacco Use Screen (Male) on  05/09/17.        Impression and Plan   Diagnosis     Diabetes mellitus, type II (WJW48-SM E11.9).     History of colon polyps (FTI36-TU Z86.010).     Hypertriglyceridemia (MOV29-TL E78.1).     Morbid Obesity (RPM46-BT E66.01).     Course:  Well controlled.    Plan:  resume statin  6 mo fu  BMI,Weight loss,exercise,diet discussed.    Patient Instructions:       Counseled: Patient, Regarding diagnosis, Regarding treatment, Regarding medications, Diet, Activity.

## 2022-02-15 NOTE — NURSING NOTE
Comprehensive Intake Entered On:  3/4/2021 10:40 AM CST    Performed On:  3/4/2021 10:36 AM CST by Concepción Hilliard               Summary   Chief Complaint :   Chronic disease visit.    Advance Directive :   No   Weight Measured :   256.2 lb(Converted to: 256 lb 3 oz, 116.210 kg)    Height Measured :   67.5 in(Converted to: 5 ft 7 in, 171.45 cm)    Body Mass Index :   39.53 kg/m2 (HI)    Body Surface Area :   2.35 m2   Systolic Blood Pressure :   144 mmHg (HI)    Diastolic Blood Pressure :   74 mmHg   Mean Arterial Pressure :   97 mmHg   Peripheral Pulse Rate :   77 bpm   BP Method :   Electronic   HR Method :   Electronic   Temperature Tympanic :   97.3 DegF(Converted to: 36.3 DegC)  (LOW)    Concepción Hilliard - 3/4/2021 10:36 AM CST   Health Status   Allergies Verified? :   Yes   Medication History Verified? :   Yes   Immunizations Current :   Other: Declines flu shot   Pre-Visit Planning Status :   Completed   Tobacco Use? :   Former smoker   Concepción Hilliard - 3/4/2021 10:36 AM CST   ID Risk Screen   Recent Travel History :   No recent travel   Family Member Travel History :   No recent travel   Other Exposure to Infectious Disease :   Unknown   COVID-19 Testing Status :   No positive COVID-19 test   Concepción Hilliard - 3/4/2021 10:36 AM CST   Social History   Social History   (As Of: 3/4/2021 10:40:15 AM CST)   Alcohol:        Past   (Last Updated: 4/18/2017 8:56:31 PM CDT by Angelita Knox)          Tobacco:        Former smoker, quit more than 30 days ago   (Last Updated: 3/4/2021 10:37:16 AM CST by Concepción Hilliard)          Electronic Cigarette/Vaping:        Electronic Cigarette Use: Never.   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   (Last Updated:  "4/18/2017 8:54:19 PM CDT by Angelita Knox)          Home/Environment:        Marital status: .  Spouse/Partner name: Romina Arboleda.  Lives with Spouse.  1 children.   (Last Updated: 4/18/2017 8:53:52 PM CDT by Angelita Knox)          Nutrition/Health:        Type of diet: Regular.   (Last Updated: 3/12/2013 3:11:32 PM CDT by Tammie Quinones)          Exercise:        Exercise frequency: \"Activities at work 5x/week\".   (Last Updated: 4/18/2017 8:57:32 PM CDT by Angelita Knox)          Sexual:        Sexually active: Yes.  Sexual orientation: Heterosexual.   (Last Updated: 3/12/2013 3:11:48 PM CDT by Tammie Quinones)  "

## 2022-02-15 NOTE — PROGRESS NOTES
Patient:   DAVID SULLIVAN            MRN: [id number]            FIN: [id number]               Age:   68 years     Sex:  Male     :  1950   Associated Diagnoses:   Diabetes mellitus, type II; History of colon polyps; Hypertriglyceridemia; Morbid Obesity   Author:   Victor M Gibbs MD      Visit Information      Date of Service: 10/09/2018 01:51 pm  Performing Location: [facility]  Encounter#: [id number]      Primary Care Provider (PCP):  Victor M Gibbs MD    NPI# [id number]      Chief Complaint   10/9/2018 1:57 PM CDT    DM check up.  Not taking Atorvastatin due to muscle aches, takes only occasionally.        History of Present Illness             The patient presents for follow-up evaluation of diabetes.  The quality of the patient's diabetes is described as being unchanged from previous visit.  Exacerbating factors consist of noncompliance with diet.  Relieving factors consist of medication.  Associated symptoms consist of none.  Glucose results: not checking.  Hemoglobin A1c results: > 6% and within target range.  Complaint: chronic snoring restless sleep apnea.  Additional pertinent history: last eye exam: 2018.        Review of Systems   Constitutional:  Negative.    Eye:  Negative.    Ear/Nose/Mouth/Throat:  Negative.    Respiratory:  Negative.    Cardiovascular:  Negative.    Gastrointestinal:  Negative.    Genitourinary:  Negative.    Hematology/Lymphatics:  Negative.    Endocrine:  Negative.    Immunologic:  Negative.    Musculoskeletal:  Negative.    Integumentary:  Negative.    Neurologic:  Negative.       Health Status   Allergies:    Allergic Reactions (Selected)  No known allergies   Medications:  (Selected)   Prescriptions  Prescribed  Trulicity Pen 1.5 mg/0.5 mL subcutaneous solution: ( 1.5 mg ), subcutaneous, qweek, # 4 EA, 5 Refill(s), Type: Maintenance, Pharmacy: [id number], Patient needs to be seen, 1.5 mg subcutaneous qweek  accu check fast clix  lancets: accu check fast clix lancets, See Instructions, Instructions: testing 2x/ day, Supply, # 100 EA, 1 Refill(s), Type: Maintenance, Pharmacy: [facility] [id number], testing 2x/ day  accu check smart view test strips: accu check smart view test strips, See Instructions, Instructions: testing 2x/ day, Supply, # 200 EA, 3 Refill(s), Type: Maintenance, Pharmacy: [facility] [id number], testing 2x/ day  atorvastatin 10 mg oral tablet: 1 tab(s) ( 10 mg ), po, qhs, # 90 tab(s), 1 Refill(s), Type: Maintenance, Pharmacy: [facility] [id number], 1 tab(s) po qhs  Documented Medications  Documented  aspirin 81 mg oral tablet: 1 tab(s) ( 81 mg ), po, daily, 0 Refill(s), Type: Maintenance,    Medications          *denotes recorded medication          accu check fast clix lancets: See Instructions, testing 2x/ day, 100 EA, 1 Refill(s).          accu check smart view test strips: See Instructions, testing 2x/ day, 200 EA, 3 Refill(s).          *aspirin 81 mg oral tablet: 81 mg, 1 tab(s), po, daily, 0 Refill(s).          atorvastatin 10 mg oral tablet: 10 mg, 1 tab(s), po, qhs, 90 tab(s), 1 Refill(s).          Trulicity Pen 1.5 mg/0.5 mL subcutaneous solution: 1.5 mg, subcutaneous, qweek, 4 EA, 5 Refill(s).       Problem list:    All Problems  History of chicken pox / SNOMED CT 667311224 / Confirmed  History of colon polyps / SNOMED CT 6645341085 / Confirmed  Hypertriglyceridemia / SNOMED CT 301242181 / Confirmed  Morbid obesity / SNOMED CT 050743222 / Probable  Diabetes mellitus, type II / SNOMED CT 149951207 / Confirmed  Resolved: History of fracture of hand / SNOMED CT 3235904027  Canceled: Colon Polyps / ICD-9-.3      Histories   Past Medical History:    Active  Diabetes mellitus, type II (543079761): Onset on 2/12/2010 at 59 years.  History of colon polyps (4744912182)  Morbid obesity (234397172)  Hypertriglyceridemia (657704219)  History of chicken pox (835064294)  Resolved  History of fracture of hand  "(0641502194): Onset in  at 42 years.  Resolved.  Comments:  2012 CDT 11:09 AM CDT - Filomena Gracia  Boxer's, right.   Family History:    Prostate carcinoma  Brother (Naveen)  Diabetes mellitus  Uncle (M)  Comments:  2012 11:03 AM - Filomena Gracia  X 2 uncles  Breast cancer  Sister (Pat, )  CA - Cancer of colon  Father ()  CAD - Coronary artery disease  Mother ()     Procedure history:    Diabetic retinal eye exam (SNOMED CT 8106132025) on 10/19/2017 at 67 Years.  Comments:  3/21/2018 6:16 PM - Arpan Williamson CMA  No Diabetic retinopathy.  Myopia of both eyes with astigmatism and presbyopia  Colonoscopy (SNOMED CT 828925131) in  at 59 Years.  Colonoscopy (SNOMED CT 913723932) in  at 53 Years.  Tonsillectomy (SNOMED CT 130503291).  sebaceous cyst removal.  ganglion cyst removal.   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      Home and Environment Assessment            Marital status: .  Spouse/Partner name: Romina Arboleda.  Lives with Spouse.  1 children.      Nutrition and Health Assessment            Type of diet: Regular.      Exercise and Physical Activity Assessment            Exercise frequency: \"Activities at work 5x/week\".      Sexual Assessment            Sexually active: Yes.  Sexual orientation: Heterosexual.        Physical Examination   Vital Signs   10/9/2018 1:57 PM CDT Temperature Tympanic 98.3 DegF    Peripheral Pulse Rate 72 bpm    HR Method Electronic    Systolic Blood Pressure 136 mmHg  HI    Diastolic Blood Pressure 67 mmHg    Mean Arterial Pressure 90 mmHg    BP Method Electronic      Measurements from flowsheet : Measurements   10/9/2018 1:57 PM CDT    Weight Measured - Standard       "          250.4 lb     General:  Alert and oriented, No acute distress.    Eye:  Pupils are equal, round and reactive to light, Extraocular movements are intact.    HENT:  Normocephalic, Oral mucosa is moist.    Neck:  Supple, Non-tender, No carotid bruit, No jugular venous distention, No lymphadenopathy, No thyromegaly.    Respiratory:  Lungs are clear to auscultation, Respirations are non-labored, Breath sounds are equal.    Cardiovascular:  Normal rate, Regular rhythm, No murmur, Good pulses equal in all extremities, No edema.    Gastrointestinal:  Soft, Non-tender, Non-distended, Normal bowel sounds, No organomegaly.    Musculoskeletal:  No tenderness, No swelling, No deformity.    Integumentary:  Warm, Dry.    Neurologic:  Alert, Oriented, No focal deficits, Cranial Nerves II-XII are grossly intact.    Psychiatric:  Cooperative, Appropriate mood & affect, Normal judgment.       Health Maintenance      Recommendations     Pending (in the next year)        OverDue           Exercise due  03/12/14  and every 1  year(s)           Preventative Services due  09/27/17  and every 5  year(s)        Due            Abdominal Aortic Aneurysm Screen (if hx of smoking) due  10/09/18  One-time only           Fall Risk Screen (Male) due  10/09/18  and every 1  year(s)           Hepatitis C Screen 6987-8067 (Male) due  10/09/18  One-time only           Lung Cancer Screen (Male) due  10/09/18  and every 1  year(s)        Near Due            DM - Eye Exam near due  10/19/18  and every 1  year(s)        Refused            Influenza Vaccine due  10/09/18  and every 1  year(s)           Zoster/Shingles Vaccine due  10/09/18  One-time only        Due In Future            DM - HgbA1c not due until  01/02/19  and every 3  month(s)           Alcohol Misuse Screen (Male) not due until  03/22/19  and every 1  year(s)           Depression Screen (Male) not due until  03/22/19  and every 1  year(s)           Body Mass Index Check (Male) not  due until  03/22/19  and every 1  year(s)           High Blood Pressure Screen (Male) not due until  03/22/19  and every 1  year(s)           Tobacco Use Screen (Male) not due until  03/22/19  and every 1  year(s)           DM - Foot Exam not due until  03/22/19  and every 1  year(s)           DM - Microalbumin not due until  03/22/19  and every 1  year(s)           Lipid Disorders Screen (Male) not due until  03/22/19  and every 1  year(s)           Colorectal Cancer Screen (Sigmoidoscopy) (Male) not due until  10/07/19  and every 10  year(s)           Colorectal Cancer Screen (Colonoscopy) (Male) not due until  10/07/19  and every 10  year(s)     Satisfied (in the past 1 year)        Satisfied            Alcohol Misuse Screen (Male) on  03/22/18.           Body Mass Index Check (Male) on  03/22/18.           DM - Eye Exam on  10/19/17.           DM - Foot Exam on  03/22/18.           DM - HgbA1c on  10/02/18.           DM - HgbA1c on  03/22/18.           DM - Microalbumin on  03/22/18.           DM - Microalbumin on  03/22/18.           Depression Screen (Male) on  03/22/18.           Depression Screen (Male) on  03/22/18.           Depression Screen (Male) on  03/22/18.           High Blood Pressure Screen (Male) on  03/22/18.           Lipid Disorders Screen (Male) on  03/22/18.           Lipid Disorders Screen (Male) on  03/22/18.           Lipid Disorders Screen (Male) on  03/22/18.           Lipid Disorders Screen (Male) on  03/22/18.           Lipid Disorders Screen (Male) on  03/22/18.           Tobacco Use Screen (Male) on  03/22/18.          Review / Management   Results review:  Lab results: 10/2/2018 10:31 AM CDT   Hgb A1c                   6.9  HI  .       Impression and Plan   Diagnosis     Diabetes mellitus, type II (TFF58-CV E11.9).     History of colon polyps (JHX24-IY Z86.010).     Hypertriglyceridemia (VIV97-AT E78.1).     Morbid Obesity (BVT21-GX E66.01).     Course:  Well controlled.    Plan:   resume statin  6 mo fu  BMI,Weight loss,exercise,diet discussed  sleep study, colonoscopy next yr.    Patient Instructions:       Counseled: Patient, Regarding diagnosis, Regarding treatment, Regarding medications, Diet, Activity.

## 2022-02-15 NOTE — PROGRESS NOTES
Patient:   DAVID SULLIVAN            MRN: [id number]            FIN: [id number]               Age:   68 years     Sex:  Male     :  1950   Associated Diagnoses:   Hypertriglyceridemia; Diabetes mellitus, type II; Morbid obesity   Author:   Elva Strong      Visit Information      Primary Care Provider (PCP):  Victor M Gibbs MD    NPI# [id number]   Referral source:  Victor M Gibbs MD.       Chief Complaint   Diabetes Self Management Education       History of Present Illness   Discussed nutrition, diabetes, and lifestyle management with pt.  Pt's main concern today is cost of Trulicity was $20, now $40, will be >$100 per month due to hitting the medicare coverage cap and then will fall into the catastrophic coverage after that.  Cost of medication unrealistic for pt to use.  Due to medicare coverage manufacturers coupons not eligible.  Pt given assistance program options but likely will not qualify.  Options discussed today.    Nutrition:  trying to follow a carb controlled meal plan but states he knows it could be a little better  am 2-3 eggs depending on size, 2 slice low carb 45 rivas toast, 1/2 banana and a small orange or 1 large fruit   noon big salad and protein   evening meal is protein, starch, vegetable   Fluids: water, coffee   Physical Activity:  no exercise routine  Stress:   low   Sleep: good  Support: family   BG Testing: did not bring meter or log   DM related medication: Trulicity 1.5mg   Routine diabetes care:    Dilated Retinal Eye Exam:  had lasix eye surgery and has eye exams yearly  Skin: intact  Dental: goes 2x/ year  Feet: monofilament test completed with MD   Immunizations: pneumonia vaccine completed   Hgb A1c: 7.2% = 160 mg/dL estimated average glucose      Health Status   Allergies:    Allergic Reactions (Selected)  No Known Medication Allergies   Medications:  (Selected)   Prescriptions  Prescribed  Trulicity Pen 1.5 mg/0.5 mL subcutaneous solution: ( 1.5 mg ), Subcutaneous,  qweek, # 2 mL, 6 Refill(s), Type: Maintenance, Pharmacy: [facility], 1.5 mg Subcutaneous qweek  accu check fast clix lancets: accu check fast clix lancets, See Instructions, Instructions: testing 2x/ day, Supply, # 100 EA, 1 Refill(s), Type: Maintenance, Pharmacy: [facility] [id number], testing 2x/ day  accu check smart view test strips: accu check smart view test strips, See Instructions, Instructions: testing 2x/ day, Supply, # 200 EA, 3 Refill(s), Type: Maintenance, Pharmacy: [facility] [id number], testing 2x/ day  atorvastatin 10 mg oral tablet: = 1 tab(s) ( 10 mg ), po, qhs, # 90 tab(s), 1 Refill(s), Type: Maintenance, Pharmacy: [facility], 1 tab(s) Oral qhs  tamsulosin 0.4 mg oral capsule: = 1 cap(s) ( 0.4 mg ), PO, Daily, # 90 cap(s), 1 Refill(s), Type: Maintenance, Pharmacy: [facility], 1 cap(s) Oral daily,    Medications          *denotes recorded medication          accu check fast clix lancets: See Instructions, testing 2x/ day, 100 EA, 1 Refill(s).          accu check smart view test strips: See Instructions, testing 2x/ day, 200 EA, 3 Refill(s).          atorvastatin 10 mg oral tablet: 10 mg, 1 tab(s), po, qhs, 90 tab(s), 1 Refill(s).          Trulicity Pen 1.5 mg/0.5 mL subcutaneous solution: 1.5 mg, Subcutaneous, qweek, 2 mL, 6 Refill(s).          tamsulosin 0.4 mg oral capsule: 0.4 mg, 1 cap(s), PO, Daily, 90 cap(s), 1 Refill(s).     Problem list:    All Problems (Selected)  BPH associated with nocturia / SNOMED CT 8458025927 / Confirmed  History of colon polyps / SNOMED CT 0648582606 / Confirmed  Hypertriglyceridemia / SNOMED CT 724366299 / Confirmed  Morbid obesity / SNOMED CT 318197945 / Probable  Obstructive sleep apnea syndrome, moderate / SNOMED CT 646462575 / Confirmed  Diabetes mellitus, type II / SNOMED CT 590737110 / Confirmed      Histories   Past Medical History:    Active  Diabetes mellitus, type II  (695848356): Onset on 2010 at 59 years.  Morbid obesity (564853249)  Hypertriglyceridemia (782150506)  BPH associated with nocturia (3939371109)  Resolved  History of fracture of hand (1209302230): Onset in  at 42 years.  Resolved.  Comments:  2012 CDT 11:09 AM CDT - Filomena Gracia  Boxkari's, right.   Family History:    Prostate carcinoma  Brother (Naveen)  Diabetes mellitus  Uncle (M)  Comments:  2012 11:03 AM CDT - Filomena Gracia  X 2 uncles  Breast cancer  Sister (Elo, )  CA - Cancer of colon  Father ()  CAD - Coronary artery disease  Mother ()     Procedure history:    Polysomnogram (SNOMED CT 441933525) on 10/29/2018 at 68 Years.  Comments:  2019 3:34 PM CST - Niki Cristobal CMA  AHI: 25.5  Diabetic retinal eye exam (SNOMED CT 9894430007) on 10/19/2017 at 67 Years.  Comments:  3/21/2018 6:16 PM CDT - Arpan Williamson CMA  No Diabetic retinopathy.  Myopia of both eyes with astigmatism and presbyopia  Colonoscopy (SNOMED CT 373382859) on 2015 at 64 Years.  Comments:  2019 1:05 PM NOLANT - Jennifer Brambila  Indication:  history of polyps.  Sedation:  Midazolam 2 mg, Fentanyl 100 mcg.  Impression:  Diverticulosis in sigmoid colon.  Exam otherwise normal.    Recommendation:  Repeat in 5 years.  Colonoscopy (SNOMED CT 058928021) on 2008 at 58 Years.  Comments:  2019 1:07 PM NOLANT - Jennifer Brambila  Indication:  History of polyps.  Sedation:  Midazolam 2 mg, Fentanyl 100 mcg.  Impression:  Two 3 mm polyps in recto-sigmoid colon.  Colonoscopy (SNOMED CT 974258897) in  at 53 Years.  Tonsillectomy (SNOMED CT 862674793).  sebaceous cyst removal.  ganglion cyst removal.   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      Home and Environment Assessment            Marital status: .  Spouse/Partner name: Romina Arboleda.  Lives with Spouse.  1 children.      Nutrition and Health Assessment            Type of diet: Regular.      Exercise and Physical Activity Assessment            Exercise frequency: \"Activities at work 5x/week\".      Sexual Assessment            Sexually active: Yes.  Sexual orientation: Heterosexual.      Health Maintenance      Recommendations     Pending (in the next year)        OverDue           Exercise due  03/12/14  and every 1  year(s)           Preventative Services due  09/27/17  and every 5  year(s)           Alcohol Misuse Screen (Male) due  03/22/19  and every 1  year(s)           Depression Screen (Male) due  03/22/19  and every 1  year(s)           DM - Foot Exam due  03/22/19  and every 1  year(s)        Due            Abdominal Aortic Aneurysm Screen (if hx of smoking) due  06/13/19  One-time only           Fall Risk Screen (Male) due  06/13/19  and every 1  year(s)           Hepatitis C Screen 0012-2133 (Male) due  06/13/19  One-time only           Lung Cancer Screen (Male) due  06/13/19  and every 1  year(s)        Near Due            DM - HgbA1c near due  08/13/19  and every 3  month(s)        Refused            Zoster/Shingles Vaccine due  06/13/19  One-time only           Influenza Vaccine due  09/01/19  and every 1  year(s)        Due In Future            DM - Eye Exam not due until  02/26/20  and every 1  year(s)           DM - Microalbumin not due until  05/13/20  and every 1  year(s)           Lipid Disorders Screen (Male) not due until  05/13/20  and every 1  year(s)           High Blood Pressure Screen (Male) not due until  05/22/20  and every 1  year(s)           Tobacco Use Screen (Male) not due until  05/22/20  and every 1  year(s)           Body Mass Index Check (Male) not due until  06/11/20  and every 1  year(s)     Satisfied (in the past 1 year)        " Satisfied            Body Mass Index Check (Male) on  06/11/19.           Body Mass Index Check (Male) on  05/22/19.           Body Mass Index Check (Male) on  11/13/18.           DM - Eye Exam on  02/26/19.           DM - HgbA1c on  05/13/19.           DM - HgbA1c on  10/02/18.           DM - Microalbumin on  05/13/19.           DM - Microalbumin on  05/13/19.           High Blood Pressure Screen (Male) on  05/22/19.           High Blood Pressure Screen (Male) on  11/13/18.           High Blood Pressure Screen (Male) on  10/09/18.           Lipid Disorders Screen (Male) on  05/13/19.           Lipid Disorders Screen (Male) on  05/13/19.           Lipid Disorders Screen (Male) on  05/13/19.           Lipid Disorders Screen (Male) on  05/13/19.           Tobacco Use Screen (Male) on  05/22/19.           Tobacco Use Screen (Male) on  11/13/18.           Tobacco Use Screen (Male) on  10/09/18.        Review / Management   Results review:  Lab results   5/13/2019 8:02 AM CDT Sodium Level 140 mmol/L    Potassium Level 4.2 mmol/L    Chloride Level 104 mmol/L    CO2 Level 27 mmol/L    Glucose Level 140 mg/dL  HI    BUN 21 mg/dL    Creatinine 0.91 mg/dL    BUN/Creat Ratio NOT APPLICABLE    eGFR 88 mL/min/1.73m2    eGFR African American 101 mL/min/1.73m2    Calcium Level 9.6 mg/dL    Hgb A1c 7.2  HI    Cholesterol 153 mg/dL    Non-    HDL 36 mg/dL  LOW    Chol/HDL Ratio 4.3    LDL 80    Triglyceride 280 mg/dL  HI    U Microalbumin 0.3 mg/dL    Ur Creatinine 103 mg/dL    Ur Microalb/Creat Ratio 3   .       Impression and Plan   Diagnosis     Hypertriglyceridemia (MDQ48-KI E78.1).     Diabetes mellitus, type II (OKB19-UH E11.9).     Morbid obesity (MWY89-LW E66.01).       Counseled: Patient, MACARIO7 Self Care Behaviors   Today the patient was provided with a review and further instruction on the progression of diabetes mellitus, prevention of complications, BG testing, and lifestyle guidelines.  Healthy Eating - Specific  education on carbohydrate counting and discussion on how foods affect BG readings, reading food labels, appropriate portion sizes, well balanced meals, diabetic plate method as a general rule.  Follow Therapeutic Lifestyle Changes (TLC) nutrition plan for heart healthy eating - encouraged pt to add psyllium fiber daily to help with glycemic control and LDL    Patient is provided with ideas on how to incorporate dietary recommendations, meal planning and preparation portion control and mindful eating.  Use meal replacement drink 3+x/ week   Being Active - Education on how exercise helps with :    - lowering BG by increasing the muscles ability to take up and use glucose   - weight loss   - healthier heart (improve lipid profile)   - improve sleep, mood, energy   - decrease stress   Monitoring - Pt is instructed on recommended testing schedule and blood glucose target levels.    Taking Medication -  Cost of Trulicity was $20, now $40, will be >$100 per month due to hitting the medicare coverage cap and then will fall into the catastrophic coverage after that.  Cost of medication unrealistic for pt to use.  Due to medicare coverage manufacturers coupons not eligible.  Pt given assistance program options but likely will not qualify.    Pt agreed to work hard on dietary interventions and monitor BG closely  will have pt start testing pre/ 2 hr pp and then evaluate next A1C to determine if alternative medication recommended  Pt will finish out supply of Trulicity and monitor BG, if above goal frequently - pt to contact RD   Problem Solving - medical support team assistance, resources provided (written and internet); good control of stress  Healthy Coping - support of friends, family, and medical support team   Reducing Risks - Detailed education on potential complications associated with uncontrolled diabetes and prevention recommendations.  Recommendations include: annual eye exam, good oral cares with brushing BID and  flossing daily, routine dental appointments, good foot care tips and shoe wear - discussed monofilament test yearly.  Importance of adequate sleep and good control of BG to prevent developing complications related to uncontrolled DM including nephropathy, neuropathy, retinopathy, and cardiovascular disease.  Weight loss goal of 7 - 10% of current body weight pounds as a reasonable goal to help increase insulin sensitivity.    Goals:   1.  Practice healthy stress management and get good quality sleep with the goal of 7-8 hours per night.    2.  Increase physical activity and make this a part of a daily routine.  Recommend 30 minutes of physical activity 5 or more days per week.    3.  Eat in a healthy way, per diabetic plate method.  Nutrition reference: Eat 3 meals a day; snacks are optional.  A meal is three or more food groups; make it colorful for better nutrition.  Incorporate TLC dietary heart healthy guidelines. 4.  Total Carbohydrate intake 30 grams for meals, snacks (optional) < 15 grams  5.  Pt to monitor BG 3 days per week and twice on those days.  BG goals: Fasting and before meals 80 - 120 mg/dL, 2 hrs after the start of a meal 80 - 140 mg/dL.    6.  Goal weight 225mg/dL 12/2019  7.  Read handouts provided. f/u in 3 mo       Professional Services   Time spent with pt 60 min   cc  Dr. Gibbs

## 2022-02-15 NOTE — TELEPHONE ENCOUNTER
---------------------  From: Venkata Kaplan LPN   To: [facility] Message Pool ([id number]_[facility]);     Cc: [facility] Message Pool ([id number]_[facility]);      Sent: 11/5/2021 10:42:59 AM CDT  Subject: General Message     Pt updated regarding positive covid test result.  Pt advised that they will be contacted by their [facility] department with further instructions.---------------------  From: Destini Rojas CMA ([facility] Message Pool ([id number]_[facility]))   To: Trevon Ocampo MD;     Sent: 11/8/2021 9:14:16 AM CST  Subject: FW: General Message

## 2022-02-15 NOTE — PROGRESS NOTES
Patient:   DAVID SULLIVAN            MRN: [id number]            FIN: [id number]               Age:   66 years     Sex:  Male     :  1950   Associated Diagnoses:   Diabetes mellitus, type II; Obesity (BMI 30-39.9); Hypertriglyceridemia   Author:   Elva Strong      Visit Information   Visit type:   Diabetes Self Management Education Follow Up  Pt was last seen by MARIA L CASTELLON on 17.    Referral source:  Abdelrahman Lara MD.       Chief Complaint   DM Type II, Obesity, Hyperlipidemia      History of Present Illness   Pt diagnosed with Type II diabetes 2010.  Weight at dx 291#.      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   Stress:   low   Sleep: tired during the day - planning on having a sleep study  Support: wife  Insulin:  none at this time   BG Testin day avg out of 6 readings 133 mg/dL  14 day avg out of 15 readings 132 mg/dL  30 day avg out of 34 readings 139 mg/dL    DM related medication: did not tolerate full dose of metformin and is off of metformin completely; starting Trulicity and will suspend glimepiride  Routine diabetes care:  up to date with eye and dental exams, feet examined with MD, no skin issues    2017 A1C 9.8% = 235mg/dL       Review of Systems             Health Status   Allergies:    Allergic Reactions (Selected)  No known allergies   Medications:  (Selected)   Prescriptions  Prescribed  Trulicity Pen 1.5 mg/0.5 mL subcutaneous solution: ( 1.5 mg ), subcutaneous, qweek, Instructions: weekly injection   rotate injection sites, # 4 EA, 3 Refill(s), Type: Maintenance, Pharmacy: [facility] #[id number], pt will  have discount card, 1.5 mg subcutaneous qweek,Instr:weekly injection ; rotate in...  accu check fast clix lancets: accu check fast clix lancets, See Instructions, Instructions: testing 2x/ day, Supply, # 1 box(es), 3 Refill(s), Type: Maintenance, Pharmacy: [id number], testing 2x/ day  accu check smart view test strips: accu check smart view test strips, See Instructions, Instructions: testing 2x/ day, Supply, # 200 EA, 3 Refill(s), Type: Maintenance, Pharmacy: [facility] #[id number], testing 2x/ day  metFORMIN 500 mg oral tablet, extended release: 4 tab(s) ( 2,000 mg ), PO, Daily, Instructions: increasing weekly to reach 4 tabs   with evening meal, # 360 tab(s), 0 Refill(s), Type: Maintenance, Pharmacy: [id number], 4 tab(s) po daily,Instr:increasing weekly to reach 4 tabs ; with even...  Documented Medications  Documented  aspirin 81 mg oral tablet: 1 tab(s) ( 81 mg ), po, daily, 0 Refill(s), Type: Maintenance   Problem list:    All Problems  History of chicken pox / SNOMED CT 306581602 / Confirmed  History of colon polyps / SNOMED CT 4372488114 / Confirmed  Hypertriglyceridemia / SNOMED CT 396712772 / Confirmed  Morbid Obesity / ICD-9-.01 / Probable  Diabetes mellitus, type II / SNOMED CT 882526449 / Confirmed  Resolved: History of fracture of hand / SNOMED CT 6846162351  Canceled: Colon Polyps / ICD-9-.3      Histories   Past Medical History:    Active  Diabetes mellitus, type II (814396826): Onset on 2010 at 59 years.  History of colon polyps (9610446302)  Hypertriglyceridemia (359664498)  History of chicken pox (303152973)  Resolved  History of fracture of hand (6122464852): Onset in  at 42 years.  Resolved.  Comments:  2012 CDT 11:09 AM [facility] - [place] , Susan Boxer's, right.   Family History:    Prostate carcinoma  Brother (Naveen)  Diabetes mellitus  Uncle (M)  Comments:  2012 11:03 AM - Filomena Gracia  X 2 uncles  Breast cancer  Sister (Pat, )  CA -  "Cancer of colon  Father ()  CAD - Coronary artery disease  Mother ()     Procedure history:    Colonoscopy (SNOMED CT 076462863) in  at 59 Years.  Colonoscopy (SNOMED CT 629493979) in  at 53 Years.  Tonsillectomy (SNOMED CT 888887764).  sebaceous cyst removal.  ganglion cyst removal.   Social History:        Alcohol Assessment: Past            Past      Tobacco Assessment: Past            Former smoker      Substance Abuse Assessment: Denies Substance Abuse            Never      Employment and Education Assessment            Retired, Work/School description: [employer] (Part time).      Home and Environment Assessment            Marital status: .  Spouse/Partner name: Romina Arboleda.  Lives with Spouse.  1 children.      Nutrition and Health Assessment            Type of diet: Regular.      Exercise and Physical Activity Assessment: Regular exercise            Exercise frequency: \"Activities at work 5x/week\".      Sexual Assessment            Sexually active: Yes.  Sexual orientation: Heterosexual.        Physical Examination   Measurements from flowsheet : Measurements   2017 5:30 PM CDT Height Measured - Standard 67.5 in    Weight Measured - Standard 249 lb    BSA 2.32 m2    Body Mass Index 38.42 kg/m2         Health Maintenance      Recommendations     Pending (in the next year)        OverDue           Exercise due  14  and every 1  year(s)        Due            DM - HgbA1c due  17  and every 3  month(s)           Lung Cancer Screen (Male) due  17  and every 1  year(s)        Refused            DM - Eye Exam due  17  and every 1  year(s)           DM - Foot Exam due  17  and every 1  year(s)           Influenza Vaccine due  17  and every 1  year(s)           Zoster/Shingles Vaccine due  17  One-time only        Due In Future            Preventative Services not due until  17  and every 5  " year(s)           DM - Microalbumin not due until  02/16/18  and every 1  year(s)           Lipid Disorders Screen (Male) not due until  02/16/18  and every 1  year(s)           Alcohol Misuse Screen (Male) not due until  02/20/18  and every 1  year(s)           Depression Screen (Male) not due until  02/20/18  and every 1  year(s)           High Blood Pressure Screen (Male) not due until  05/09/18  and every 1  year(s)           Tobacco Use Screen (Male) not due until  05/09/18  and every 1  year(s)     Satisfied (in the past 1 year)        Satisfied            Alcohol Misuse Screen (Male) on  02/20/17.           Aspirin Therapy for Prevention of CVD (Male) on  05/09/17.           Body Mass Index Check (Male) on  05/22/17.           Body Mass Index Check (Male) on  05/09/17.           Body Mass Index Check (Male) on  04/04/17.           Body Mass Index Check (Male) on  03/17/17.           Body Mass Index Check (Male) on  02/15/17.           DM - HgbA1c on  02/16/17.           DM - Microalbumin on  02/16/17.           DM - Microalbumin on  02/16/17.           Depression Screen (Male) on  02/20/17.           Depression Screen (Male) on  02/20/17.           Depression Screen (Male) on  02/20/17.           High Blood Pressure Screen (Male) on  05/09/17.           High Blood Pressure Screen (Male) on  03/17/17.           High Blood Pressure Screen (Male) on  02/15/17.           Lipid Disorders Screen (Male) on  02/16/17.           Lipid Disorders Screen (Male) on  02/16/17.           Lipid Disorders Screen (Male) on  02/16/17.           Lipid Disorders Screen (Male) on  02/16/17.           Lipid Disorders Screen (Male) on  02/16/17.           Tobacco Use Screen (Male) on  05/09/17.           Tobacco Use Screen (Male) on  03/17/17.           Tobacco Use Screen (Male) on  02/15/17.           Tobacco Use Screen (Male) on  08/05/16.          Review / Management   Results review:  Lab results   5/9/2017 12:00 PM CDT Sodium  Level 138 mmol/L    Potassium Level 4.1 mmol/L    Chloride Level 103 mmol/L    CO2 Level 27 mmol/L    Glucose Level 145 mg/dL  HI    BUN 16 mg/dL    Creatinine 0.91 mg/dL    BUN/Creat Ratio NOT APPLICABLE    eGFR 89 mL/min/1.73m2    eGFR African American 103 mL/min/1.73m2    Calcium Level 9.5 mg/dL    WBC 7.1    RBC 5.28    Hgb 16.3 gm/dL    Hct 48.2 %    MCV 91.3 fL    MCH 30.7 pg    MCHC 33.7 gm/dL    RDW 13.0 %    Platelet 209    MPV 9.4 fL   3/17/2017 2:17 PM CDT Glucose Level 235 mg/dL  HI    UA Color YELLOW    UA Appear CLEAR    UA pH < OR = 5.0    UA Specific Gravity 1.026    UA Glucose 3+    UA Bilirubin NEGATIVE    UA Ketones NEGATIVE    UA Blood NEGATIVE    UA Protein NEGATIVE    UA Nitrite NEGATIVE    UA Leuk Est NEGATIVE    Lyme Ab IgG/IgM West Blot <0.90   2/16/2017 8:24 AM CST Sodium Level 135 mmol/L    Potassium Level 4.0 mmol/L    Chloride Level 101 mmol/L    CO2 Level 25 mmol/L    Glucose Level 245 mg/dL  HI    BUN 18 mg/dL    Creatinine 0.97 mg/dL    BUN/Creat Ratio NOT APPLICABLE    eGFR 82 mL/min/1.73m2    eGFR African American 95 mL/min/1.73m2    Calcium Level 9.4 mg/dL    Hgb A1c 9.8  HI    Cholesterol 230 mg/dL  HI    Non-  HI    HDL 27 mg/dL  LOW    Chol/HDL Ratio 8.5  HI    LDL See comment    LDL Direct 89 mg/dL    Triglyceride 728 mg/dL  HI    U Microalbumin 0.2 mg/dL    Ur Creatinine 78 mg/dL    Ur Microalb/Creat Ratio 3   .       Impression and Plan   Diagnosis     Diabetes mellitus, type II (EPE17-GZ E11.9).     Obesity (BMI 30-39.9) (YXU12-FC E66.9).     Hypertriglyceridemia (MZY90-LU E78.1).       Counseled: Patient, Further instruction on AADE7 Self Care Behaviors; Motivational counseling to help achieve goals and congratulated pt on improvements in lifestyle.    Discussed living well with diabetes, futher information about diabetes disease process, reviewed chronic condition and appropriate treatment and self management  Healthy Eating - Review and further instruction on  carbohydrate counting, reading food labels, appropriate portion sizes, well balanced meals, diabetic plate method as a general rule.  Patient is provided with additional magazines with recipes, meal planning ideas to incorporate dietary recommendations, meal planning and preperation.  Weight loss tips with mindful eating.  Education review on decreaseing cardiovascular risk with following Therapeutic Lifestyle Changes (TLC) nutrition plan for heart healthy eating.    Being Active - Education on how exercise helps with : pt will continue to work towards goal    - lowering BG by increasing the muscles ability to take up and use glucose   - weight loss   - healthier heart (improve lipid profile)   - improve sleep, mood, energy   - decrease stress      Monitoring - Reviewed ecommended testing schedule and blood glucose target levels.  Encouraged testing in pairs pre and 2 hrs after meals   Taking Medication - discussed potentially needing to add back extended release metformin at lower dose or adding a different medication depending on next a1C, continue with Trulicity.  Reviewed s/sx of hypoglycemia and treatment protocol.    Problem Solving -  medical support team assistance, resources provided (written); good control of stress  Healthy Coping - support of friends, family, and medical support team   Reducing Risks - Education on importance of good glucose control to help reduce the potential for developing microvascular and macrovacular complications associated with high blood glucose over time.    Education on the following complications    *heart attack/cardiovascular disease - prevention with heart healthy diet, daily activity, and weight control   *retinopathy - annual eye exam   *nephropathy - discussion on annual or prn kidney function labs with urinanalysis    *stroke - maintaining recommended glucose control   *peripheral aterial disease - regular activity, maintaining recommended glucose control   *neuropathy  - monofiliment testing, regular activity, maintaining recommended glucose control  Appropriate foot care education  Vaccinations as recommended influenza, pneumonia etc.   Routine dental appts for prevention of periodontal diseases   Importance of adequate sleep   Good skin care, monitor for any open areas, sores, or cuts.    Weight loss goal of 7 - 10% of weight as a reasonable goal to help increase insulin sensitivity           Pt able to verbalize understanding of above education, handouts provided for additional resources.       Goals:   1.  Practice healthy stress management and get good quality sleep with the goal of 7-8 hours per night.    2.   Physical activity: Recommend increasing to 2 hrs and 30 min a week (150 minutes) of moderate-intensity, or 1 hr and 15 min (75 minutes) a week of vigorous-intensity aerobic physical activity, or an equivalent combination of moderate- and vigorous-intensity aerobic physical activity.  Aerobic activity should be performed in episodes of at least 10 minutes, preferably spread throughout the week.  Muscle-strengthening activities on 2 or more days per week.    3.  Eat in a healthy way, per diabetic plate method.  Nutrition reference: Eat 3 meals a day; snacks are optional.  A meal is three or more food groups; make it colorful for better nutrition.    4.  Total Carbohydrate intake 30 grams for meals, snacks ~ 15 grams  5.  Pt to monitor BG BID.  BG goals: Fasting and before meals 80 - 120 mg/dL, 2 hrs after the start of a meal 80 - 140 mg/dL.    6.  Goal weight 235 by 10/2017   7.  Read handouts provided.  F/u in 4-6 months      Professional Services   Time spent with pt 60 min   cc Dr. Abdelrahman Lara

## 2022-02-15 NOTE — NURSING NOTE
Comprehensive Intake Entered On:  9/16/2021 2:34 PM CDT    Performed On:  9/16/2021 2:28 PM CDT by Gracia King CMA               Summary   Chief Complaint :   DM/HTN f/u and refills.    Advance Directive :   No   Weight Measured :   248 lb(Converted to: 248 lb 0 oz, 112.491 kg)    Systolic Blood Pressure :   138 mmHg (HI)    Diastolic Blood Pressure :   92 mmHg (HI)    Mean Arterial Pressure :   107 mmHg   Peripheral Pulse Rate :   76 bpm   BP Site :   Right arm   Pulse Site :   Radial artery   BP Method :   Manual   HR Method :   Manual   Gracia King CMA - 9/16/2021 2:28 PM CDT   Health Status   Allergies Verified? :   Yes   Medication History Verified? :   Yes   Immunizations Current :   Other: Declines flu shot   Pre-Visit Planning Status :   Not completed   Tobacco Use? :   Former smoker   Gracia King CMA - 9/16/2021 2:28 PM CDT   Demographics   Last Name :   Robles   Address :   [address] [place] [address]   First Name :   Alfredo Gore Initial :   SHE   City :   [place]   State :   [place]   Zip Code :   [address]   Contact Relationship to Patient (other) :   Patient   Fernando Gracia MIDDLETON - 9/16/2021 2:28 PM CDT   Consents   Consent for Immunization Exchange :   Consent Granted   Consent for Immunizations to Providers :   Consent Granted   Gracia King CMA - 9/16/2021 2:28 PM CDT   Meds / Allergies   (As Of: 9/16/2021 2:34:53 PM CDT)   Allergies (Active)   No Known Medication Allergies  Estimated Onset Date:   Unspecified ; Created By:   Tala Borden MA; Reaction Status:   Active ; Category:   Drug ; Substance:   No Known Medication Allergies ; Type:   Allergy ; Updated By:   Tala Borden MA; Reviewed Date:   3/4/2021 11:09 AM CST        Medication List   (As Of: 9/16/2021 2:34:53 PM CDT)   Prescription/Discharge Order    alfuzosin  :   alfuzosin ; Status:   Prescribed ; Ordered As Mnemonic:   Uroxatral 10 mg oral tablet, extended release ; Simple Display Line:   10 mg, 1 tab(s), Oral, daily, 7 tab(s), 0 Refill(s) ;  Ordering Provider:   Jermaine Ramírez MD; Catalog Code:   alfuzosin ; Order Dt/Tm:   9/14/2021 1:28:20 PM CDT          dulaglutide  :   dulaglutide ; Status:   Prescribed ; Ordered As Mnemonic:   Trulicity Pen 3 mg/0.5 mL subcutaneous solution ; Simple Display Line:   3 mg, Subcutaneous, qweek, 6 mL, 0 Refill(s) ; Ordering Provider:   Victor M Gibbs MD; Catalog Code:   dulaglutide ; Order Dt/Tm:   8/27/2021 2:35:13 PM CDT          Miscellaneous Rx Supply  :   Miscellaneous Rx Supply ; Status:   Prescribed ; Ordered As Mnemonic:   CPAP Machine and Supplies ; Simple Display Line:   See Instructions, Auto Titrating 4-20cm.  Heated humidifier, heated tubing, filters, full face mask of choice with headgear.  Replacement cushions and supplies as needed.   Months=99 (lifetime), 1 EA, 0 Refill(s) ; Ordering Provider:   Victor M Gibbs MD; Catalog Code:   Miscellaneous Rx Supply ; Order Dt/Tm:   3/4/2021 11:29:42 AM CST          losartan  :   losartan ; Status:   Prescribed ; Ordered As Mnemonic:   losartan 50 mg oral tablet ; Simple Display Line:   50 mg, 1 tab(s), Oral, daily, 90 tab(s), 1 Refill(s) ; Ordering Provider:   Victor M Gibbs MD; Catalog Code:   losartan ; Order Dt/Tm:   3/4/2021 10:45:04 AM CST          atorvastatin  :   atorvastatin ; Status:   Prescribed ; Ordered As Mnemonic:   atorvastatin 10 mg oral tablet ; Simple Display Line:   10 mg, 1 tab(s), po, qhs, 90 tab(s), 1 Refill(s) ; Ordering Provider:   Victor M Gibbs MD; Catalog Code:   atorvastatin ; Order Dt/Tm:   7/28/2020 1:19:22 PM CDT          Miscellaneous Prescription  :   Miscellaneous Prescription ; Status:   Prescribed ; Ordered As Mnemonic:   accu check smart view test strips ; Simple Display Line:   See Instructions, testing 2x/ day, 200 EA, 3 Refill(s) ; Ordering Provider:   Victor M Gibbs MD; Catalog Code:   Miscellaneous Prescription ; Order Dt/Tm:   3/22/2018 2:16:54 PM CDT          Miscellaneous Prescription  :    Miscellaneous Prescription ; Status:   Prescribed ; Ordered As Mnemonic:   accu check fast clix lancets ; Simple Display Line:   See Instructions, testing 2x/ day, 100 EA, 1 Refill(s) ; Ordering Provider:   Victor M Gibbs MD; Catalog Code:   Miscellaneous Prescription ; Order Dt/Tm:   3/22/2018 2:16:19 PM CDT

## 2022-02-15 NOTE — NURSING NOTE
Quick Intake Entered On:  6/4/2020 2:14 PM CDT    Performed On:  6/4/2020 2:14 PM CDT by Halley Santos LPN               Summary   Chief Complaint :   BP with labs to follow   Advance Directive :   No   Height Measured :   67.5 in(Converted to: 5 ft 7 in, 171.45 cm)    Systolic Blood Pressure :   130 mmHg   Diastolic Blood Pressure :   80 mmHg   Mean Arterial Pressure :   97 mmHg   BP Site :   Right arm   BP Method :   Manual   HR Method :   Electronic   Halley Santos LPN - 6/4/2020 2:14 PM CDT   ID Risk Screen   Recent Travel History :   No recent travel   Family Member Travel History :   No recent travel   Other Exposure to Infectious Disease :   Unknown   Halley Santos LPN - 6/4/2020 2:14 PM CDT

## 2022-02-15 NOTE — TELEPHONE ENCOUNTER
---------------------From: Liza Morillo (Phone Messages Pool ([id number]_[place] [employer])) To: Emmett Langford MD;   Sent: 10/28/2019 12:58:41 PM CDTSubject: [facility]  Phone MessagePCP: TFSTime of Call: [phone] Number: None left- spoke to lady Note: [facility] called. They need CPAP order and notes from previous visit for insurance reasons. Can you put order in for CPAP?Fax @ [phone] when complete---------------------From: Emmett Langford MD To: Phone Messages Pool ([id number]_[facility]);   Sent: 10/29/2019 9:56:44 AM CDTSubject: RE: [facility]  DONE---------------------From: Cristiana Nieto (Phone Messages Pool ([id number]_[facility])) To: ARLENE Message Pool ([id number]_[facility]);   Sent: 10/29/2019 10:19:30 AM CDTSubject: FW: [facility] Medical EquipmentPapers printed and sent to [place] to fax.

## 2022-02-15 NOTE — PROGRESS NOTES
Patient:   DAVID SULLIVAN            MRN: [id number]            FIN: [id number]               Age:   68 years     Sex:  Male     :  1950   Associated Diagnoses:   Diabetes mellitus, type II; Hypertriglyceridemia; Morbid Obesity; Obstructive sleep apnea syndrome, moderate   Author:   Victor M Gibbs MD      Visit Information      Date of Service: 10/04/2019 01:37 pm  Performing Location: [facility]  Encounter#: [id number]      Primary Care Provider (PCP):  Victor M Gibbs MD    NPI# [id number]      Referring Provider:  Victor M Gibbs MD# [id number]      Chief Complaint       10/4/2019 1:41 PM CDT DM check   10/3/2019 1:49 PM CDT Patient here to get updated on CPAP  and CPAP equipment         History of Present Illness   Here for fu  retired wife is too  Now on Medicare  Trulicity cost too much  intolerant  metformin  bph controlled with  flomax  now on cpap  Statin as directed  occasional sugar check      Review of Systems   Constitutional:  Negative.    Eye:  Negative.    Ear/Nose/Mouth/Throat:  Negative.    Respiratory:  Negative.    Cardiovascular:  Negative.    Gastrointestinal:  Negative.    Genitourinary:  Negative.    Hematology/Lymphatics:  Negative.    Endocrine:  Negative.    Immunologic:  Negative.    Musculoskeletal:  Negative.    Integumentary:  Negative.    Neurologic:  Negative.       Health Status   Allergies:    Allergic Reactions (Selected)  No Known Medication Allergies   Medications:  (Selected)   Prescriptions  Prescribed  accu check fast clix lancets: accu check fast clix lancets, See Instructions, Instructions: testing 2x/ day, Supply, # 100 EA, 1 Refill(s), Type: Maintenance, Pharmacy: [id number], testing 2x/ day  accu check smart view test strips: accu check smart view test strips, See Instructions, Instructions: testing 2x/ day, Supply, # 200 EA, 3 Refill(s), Type: Maintenance, Pharmacy: [facility] [id number], testing 2x/  day  atorvastatin 10 mg oral tablet: = 1 tab(s) ( 10 mg ), po, qhs, # 90 tab(s), 1 Refill(s), Type: Maintenance, Pharmacy: [facility], 1 tab(s) Oral qhs  glipiZIDE 5 mg oral tablet, extended release: = 1 tab(s) ( 5 mg ), Oral, daily, # 90 tab(s), 2 Refill(s), Type: Maintenance, Pharmacy: [facility], 1 tab(s) Oral daily  tamsulosin 0.4 mg oral capsule: = 1 cap(s) ( 0.4 mg ), PO, Daily, # 90 cap(s), 1 Refill(s), Type: Maintenance, Pharmacy: [facility], 1 cap(s) Oral daily,    Medications          *denotes recorded medication          accu check fast clix lancets: See Instructions, testing 2x/ day, 100 EA, 1 Refill(s).          accu check smart view test strips: See Instructions, testing 2x/ day, 200 EA, 3 Refill(s).          atorvastatin 10 mg oral tablet: 10 mg, 1 tab(s), po, qhs, 90 tab(s), 1 Refill(s).          glipiZIDE 5 mg oral tablet, extended release: 5 mg, 1 tab(s), Oral, daily, 90 tab(s), 2 Refill(s).          tamsulosin 0.4 mg oral capsule: 0.4 mg, 1 cap(s), PO, Daily, 90 cap(s), 1 Refill(s).       Problem list:    All Problems  BPH associated with nocturia / SNOMED CT 0317144755 / Confirmed  History of colon polyps / SNOMED CT 4198378563 / Confirmed  Hypertriglyceridemia / SNOMED CT 909474962 / Confirmed  Morbid obesity / SNOMED CT 242985225 / Probable  Obstructive sleep apnea syndrome, moderate / SNOMED CT 963238443 / Confirmed  Diabetes mellitus, type II / SNOMED CT 669737660 / Confirmed  Inactive: History of chicken pox / SNOMED CT 865108020  Resolved: History of fracture of hand / SNOMED CT 1606798853  Canceled: Colon Polyps / ICD-9-.3      Histories   Past Medical History:    Active  Diabetes mellitus, type II (177032094): Onset on 2/12/2010 at 59 years.  Morbid obesity (751018396)  Hypertriglyceridemia (367664977)  BPH associated with nocturia (3047987580)  Resolved  History of fracture of hand (2945307694): Onset in   at 42 years.  Resolved.  Comments:  2012 CDT 11:09 AM CDT - Basil , Susan Boxer's, right.   Family History:    Prostate carcinoma  Brother (Naveen)  Diabetes mellitus  Uncle (M)  Comments:  2012 11:03 AM CDT - Filomena Gracia  X 2 uncles  Breast cancer  Sister (Elo, )  CA - Cancer of colon  Father ()  CAD - Coronary artery disease  Mother ()     Procedure history:    Polysomnogram (SNOMED CT 703327111) on 10/29/2018 at 68 Years.  Comments:  2019 3:34 PM CST - Niki Cristobal CMA  AHI: 25.5  Diabetic retinal eye exam (SNOMED CT 3442135574) on 10/19/2017 at 67 Years.  Comments:  3/21/2018 6:16 PM CDT - Arpan Williamson CMA  No Diabetic retinopathy.  Myopia of both eyes with astigmatism and presbyopia  Colonoscopy (SNOMED CT 012515657) on 2015 at 64 Years.  Comments:  2019 1:05 PM CDT - Jennifer Brambila  Indication:  history of polyps.  Sedation:  Midazolam 2 mg, Fentanyl 100 mcg.  Impression:  Diverticulosis in sigmoid colon.  Exam otherwise normal.    Recommendation:  Repeat in 5 years.  Colonoscopy (SNOMED CT 905153682) on 2008 at 58 Years.  Comments:  2019 1:07 PM NOLANT - Jennifer Brambila  Indication:  History of polyps.  Sedation:  Midazolam 2 mg, Fentanyl 100 mcg.  Impression:  Two 3 mm polyps in recto-sigmoid colon.  Colonoscopy (SNOMED CT 036045502) in  at 53 Years.  Tonsillectomy (SNOMED CT 700392316).  sebaceous cyst removal.  ganglion cyst removal.   Social History:        Alcohol Assessment            Past      Tobacco Assessment            Former smoker      Substance Abuse Assessment            Never      Employment and Education Assessment            Retired, Work/School description: [employer] (Part time).      Home and Environment Assessment            Marital status: .  Spouse/Partner name: Romina Arboleda.  Lives with Spouse.  1 children.      Nutrition and Health Assessment            Type of  "diet: Regular.      Exercise and Physical Activity Assessment            Exercise frequency: \"Activities at work 5x/week\".      Sexual Assessment            Sexually active: Yes.  Sexual orientation: Heterosexual.        Physical Examination   Vital Signs   10/4/2019 1:41 PM CDT Temperature Tympanic 98.2 DegF    Peripheral Pulse Rate 76 bpm    HR Method Electronic    Systolic Blood Pressure 123 mmHg    Diastolic Blood Pressure 78 mmHg    Mean Arterial Pressure 93 mmHg    BP Method Electronic   10/3/2019 1:49 PM CDT Temperature Tympanic 97.8 DegF  LOW    Peripheral Pulse Rate 76 bpm    HR Method Electronic    Systolic Blood Pressure 132 mmHg  HI    Diastolic Blood Pressure 70 mmHg    Mean Arterial Pressure 91 mmHg    BP Site Right arm    BP Method Manual    Oxygen Saturation 97 %      Measurements from flowsheet : Measurements   10/4/2019 1:41 PM CDT Height Measured - Standard 67.5 in    Weight Measured - Standard 248.2 lb    BSA 2.31 m2    Body Mass Index 38.3 kg/m2  HI   10/3/2019 1:49 PM CDT Height Measured - Standard 67.5 in      General:  Alert and oriented, No acute distress.    Eye:  Pupils are equal, round and reactive to light, Extraocular movements are intact.    HENT:  Normocephalic, Oral mucosa is moist.    Neck:  Supple, Non-tender, No carotid bruit, No jugular venous distention, No lymphadenopathy, No thyromegaly.    Respiratory:  Lungs are clear to auscultation, Respirations are non-labored, Breath sounds are equal.    Cardiovascular:  Normal rate, Regular rhythm, No murmur, Good pulses equal in all extremities, No edema.    Gastrointestinal:  Soft, Non-tender, Non-distended, Normal bowel sounds, No organomegaly.    Integumentary:  Warm, Dry.    Feet:  Normal by visual exam, Normal pulses, Sensation intact.    Neurologic:  Alert, Oriented, No focal deficits, Cranial Nerves II-XII are grossly intact.    Psychiatric:  Cooperative, Appropriate mood & affect, Normal judgment.       Health Maintenance    "   Recommendations     Pending (in the next year)        OverDue           Exercise due  03/12/14  and every 1  year(s)           Preventative Services due  09/27/17  and every 5  year(s)           Alcohol Misuse Screen (Male) due  03/22/19  and every 1  year(s)           Depression Screen (Male) due  03/22/19  and every 1  year(s)           DM - Foot Exam due  03/22/19  and every 1  year(s)           DM - HgbA1c due  08/13/19  and every 3  month(s)        Due            Abdominal Aortic Aneurysm Screen (if hx of smoking) due  10/04/19  One-time only           Fall Risk Screen (Male) due  10/04/19  and every 1  year(s)           Hepatitis C Screen [id number] (Male) due  10/04/19  One-time only           Lung Cancer Screen (Male) due  10/04/19  and every 1  year(s)        Refused            Influenza Vaccine due  09/01/19  and every 1  year(s)           Zoster/Shingles Vaccine due  10/04/19  One-time only        Due In Future            DM - Eye Exam not due until  02/26/20  and every 1  year(s)           DM - Microalbumin not due until  05/13/20  and every 1  year(s)           Lipid Disorders Screen (Male) not due until  05/13/20  and every 1  year(s)     Satisfied (in the past 1 year)        Satisfied            Body Mass Index Check (Male) on  10/04/19.           Body Mass Index Check (Male) on  06/11/19.           Body Mass Index Check (Male) on  05/22/19.           Body Mass Index Check (Male) on  11/13/18.           DM - Eye Exam on  02/26/19.           DM - HgbA1c on  05/13/19.           DM - Microalbumin on  05/13/19.           DM - Microalbumin on  05/13/19.           High Blood Pressure Screen (Male) on  10/04/19.           High Blood Pressure Screen (Male) on  10/03/19.           High Blood Pressure Screen (Male) on  05/22/19.           High Blood Pressure Screen (Male) on  11/13/18.           High Blood Pressure Screen (Male) on  10/09/18.           Lipid Disorders Screen (Male) on  05/13/19.            Lipid Disorders Screen (Male) on  05/13/19.           Lipid Disorders Screen (Male) on  05/13/19.           Lipid Disorders Screen (Male) on  05/13/19.           Tobacco Use Screen (Male) on  10/04/19.           Tobacco Use Screen (Male) on  10/03/19.           Tobacco Use Screen (Male) on  05/22/19.           Tobacco Use Screen (Male) on  11/13/18.           Tobacco Use Screen (Male) on  10/09/18.          Review / Management   Results review      Impression and Plan   Diagnosis     Diabetes mellitus, type II (RFZ11-QJ E11.9).     Hypertriglyceridemia (GEG31-XE E78.1).     Morbid Obesity (ZGP63-AR E66.01).     Obstructive sleep apnea syndrome, moderate (JKR33-SB G47.33).     Course:  Not well controlled.    Plan:  add glipizide  6 mo fu  BMI,Weight loss,exercise,diet discussed   .    Patient Instructions:       Counseled: Patient, Regarding diagnosis, Regarding treatment, Regarding medications, Diet, Activity.

## 2022-02-15 NOTE — LETTER
(Inserted Image. Unable to display)   July 09, 2021    DAVID SULLIVAN  [address] [place] [address]            Dear ,      Thank you for selecting [facility] for your healthcare needs.    Your Healthcare Provider has recommended that you schedule a diabetes management appointment with our Certified Diabetes Educator, Elva Strong RD, CD, CDE.     Please bring your glucose meter and your blood glucose diary to your appointment.    Available services include:  - Education about diabetes with guidance and support   - Education on the 7 Self Care Behaviors for Diabetes Management:     Healthy Eating   portion control, label readings, carbohydrate recommendations, heart healthy guidelines, meal planning    Being Active - Physical activity guidelines     Monitoring   blood glucose targets, evaluation of blood glucose readings and lab results    Taking Medication   medication management    Problem Solving   discuss any concerns/ questions     Healthy Coping   disease progression and management    Reducing Risks   prevention strategies to help avoid potential complications related to uncontrolled diabetes  - Weight loss strategies      (FYI   Regarding office visit: In some instances, a video visit may be offered as an option.)        To schedule an appointment or if you have further questions, please contact your clinic at [phone].      Powered by [employer]    Sincerely,    Elva Strong RD, CD, CDE

## 2022-02-15 NOTE — NURSING NOTE
Comprehensive Intake Entered On:  10/4/2019 1:43 PM CDT    Performed On:  10/4/2019 1:41 PM CDT by Concepción Hilliard               Summary   Chief Complaint :   DM check    Advance Directive :   No   Weight Measured :   248.2 lb(Converted to: 248 lb 3 oz, 112.58 kg)    Height Measured :   67.5 in(Converted to: 5 ft 7 in, 171.45 cm)    Body Mass Index :   38.3 kg/m2 (HI)    Body Surface Area :   2.31 m2   Systolic Blood Pressure :   123 mmHg   Diastolic Blood Pressure :   78 mmHg   Mean Arterial Pressure :   93 mmHg   Peripheral Pulse Rate :   76 bpm   BP Method :   Electronic   HR Method :   Electronic   Temperature Tympanic :   98.2 DegF(Converted to: 36.8 DegC)    Concepción Hilliard - 10/4/2019 1:41 PM CDT   Health Status   Allergies Verified? :   Yes   Medication History Verified? :   Yes   Immunizations Current :   Other: Declines flu shot   Pre-Visit Planning Status :   Completed   Tobacco Use? :   Former smoker   Concepción Hilliard - 10/4/2019 1:41 PM CDT

## 2022-02-15 NOTE — LETTER
(Inserted Image. Unable to display)   June 08, 2020      DAVID SULLIVAN      [address] [place], [place] [id number]        Dear ,    Thank you for selecting [facility] for your healthcare needs.  Below you will find the results of the recent tests done at our clinic.     We will discuss this at your next visit.      Result Name Current Result Previous Result Reference Range   Hgb A1c ((H)) 7.6 6/4/2020 ((H)) 6.8 1/16/2020  - <5.7   Ur Microalbumin/Creatinine Ratio  5 6/4/2020  3 5/13/2019  - <30       Please contact me or my assistant at [phone] if you have any questions.     Sincerely,        Victor M Gibbs MD        What do your labs mean?  Below is a glossary of commonly ordered labs:  LDL   Bad Cholesterol   HDL   Good Cholesterol  AST/ALT   Liver Function   Cr/Creatinine   Kidney Function  Microalbumin   Kidney Function  BUN   Kidney Function  PSA   Prostate    TSH   Thyroid Hormone  HgbA1c   Diabetes Test   Hgb (Hemoglobin)   Red Blood Cells

## 2022-02-15 NOTE — PROGRESS NOTES
Patient:   DAVID SULLIVAN            MRN: [id number]            FIN: [id number]               Age:   69 years     Sex:  Male     :  1950   Associated Diagnoses:   Diabetes mellitus, type II; Morbid Obesity; BPH associated with nocturia; History of colon polyps   Author:   Victor M Gibbs MD      Visit Information      Date of Service: 2020 12:48 pm  Performing Location: [facility]  Encounter#: [id number]      Primary Care Provider (PCP):  Victor M Gibbs MD# [id number]      Referring Provider:  Victor M Gibbs MD# [id number]      Chief Complaint   2020 12:57 PM CDT   Chronic disease management        History of Present Illness   Patient is here for 6-month follow-up.  He is taking his Trulicity as directed.  He is not been very compliant with diet though admittedly with the current pandemic.  He is on his atorvastatin for his lipids uses a CPAP every night Flomax makes him feel little tired fatigue but it helps his BPH symptoms he does not want to see urologist at this time.  He is due for colonoscopy because of his cold history of colon polyps.            Review of Systems   Constitutional:  Negative.    Eye:  Negative.    Ear/Nose/Mouth/Throat:  Negative.    Respiratory:  Negative.    Cardiovascular:  Negative.    Gastrointestinal:  Negative.    Genitourinary:  Negative.    Hematology/Lymphatics:  Negative.    Endocrine:  Negative.    Immunologic:  Negative.    Musculoskeletal:  Negative.    Integumentary:  Negative.    Neurologic:  Negative.       Health Status   Allergies:    Allergic Reactions (Selected)  No Known Medication Allergies   Medications:  (Selected)   Prescriptions  Prescribed  Auto Titrating CPAP 6-16 for daily use: Auto Titrating CPAP 6-16 for daily use, See Instructions, Instructions: Heated humidifier x1; Humidifier chamber x1;  Heated tubing x1; Full face mask of choice with headgear  x1; Cushion x 1;  Filters: Disposable x1pk & Reusable  x1pk.  Length of Need...  Trulicity Pen 1.5 mg/0.5 mL subcutaneous solution: ( 1.5 mg ), Subcutaneous, qweek, # 4 EA, 11 Refill(s), Type: Maintenance, Pharmacy: [facility], 1.5 mg Subcutaneous qweek  accu check fast clix lancets: accu check fast clix lancets, See Instructions, Instructions: testing 2x/ day, Supply, # 100 EA, 1 Refill(s), Type: Maintenance, Pharmacy: [facility], testing 2x/ day  accu check smart view test strips: accu check smart view test strips, See Instructions, Instructions: testing 2x/ day, Supply, # 200 EA, 3 Refill(s), Type: Maintenance, Pharmacy: [facility] [place] [address], testing 2x/ day  atorvastatin 10 mg oral tablet: = 1 tab(s) ( 10 mg ), po, qhs, # 90 tab(s), 1 Refill(s), Type: Maintenance, Pharmacy: [facility], 1 tab(s) Oral qhs  losartan 25 mg oral tablet: = 1 tab(s) ( 25 mg ), Oral, daily, # 90 tab(s), 1 Refill(s), Type: Maintenance, Pharmacy: [facility], 1 tab(s) Oral daily, 67.5, in, 07/28/20 12:57:00 CDT, Height Measured, 252, lb, 07/28/20 12:57:00 CDT, Weight Measured  tamsulosin 0.4 mg oral capsule: = 1 cap(s), Oral, daily, # 30 cap(s), 0 Refill(s), Type: Maintenance, Pharmacy: [facility], 67.5, in, 06/04/20 14:14:00 CDT, Height Measured, 256, lb, 01/16/20 10:53:00 CST, Weight Measured,    Medications          *denotes recorded medication          accu check fast clix lancets: See Instructions, testing 2x/ day, 100 EA, 1 Refill(s).          accu check smart view test strips: See Instructions, testing 2x/ day, 200 EA, 3 Refill(s).          Auto Titrating CPAP 6-16 for daily use: See Instructions, Heated humidifier x1; Humidifier chamber x1;  Heated tubing x1; Full face mask of choice with headgear  x1; Cushion x 1;  Filters: Disposable x1pk & Reusable x1pk.  Length of Need = 99 Months, 1 EA, 11 Refill(s).          atorvastatin 10 mg oral tablet: 10 mg, 1 tab(s), po,  qhs, 90 tab(s), 1 Refill(s).          Trulicity Pen 1.5 mg/0.5 mL subcutaneous solution: 1.5 mg, Subcutaneous, qweek, 4 EA, 11 Refill(s).          losartan 25 mg oral tablet: 25 mg, 1 tab(s), Oral, daily, 90 tab(s), 1 Refill(s).          tamsulosin 0.4 mg oral capsule: 1 cap(s), Oral, daily, 30 cap(s), 0 Refill(s).       Problem list:    All Problems (Selected)  Astigmatism of both eyes with presbyopia / SNOMED CT 299061106 / Confirmed  BPH associated with nocturia / SNOMED CT 5705128041 / Confirmed  History of colon polyps / SNOMED CT 0489262567 / Confirmed  Hypertriglyceridemia / SNOMED CT 652474866 / Confirmed  Morbid obesity / SNOMED CT 659384298 / Probable  Nuclear sclerotic cataract of both eyes / SNOMED CT 088614503 / Confirmed  Obstructive sleep apnea syndrome, moderate / SNOMED CT 842000591 / Confirmed  Ptosis of eyelid, bilateral / SNOMED CT 48544475 / Confirmed  Diabetes mellitus, type II / SNOMED CT 365411819 / Confirmed      Histories   Past Medical History:    Active  Diabetes mellitus, type II (580991704): Onset on 2010 at 59 years.  Morbid obesity (052751518)  Hypertriglyceridemia (391834703)  BPH associated with nocturia (4500182170)  Astigmatism of both eyes with presbyopia (003554192)  Nuclear sclerotic cataract of both eyes (220317010)  Ptosis of eyelid, bilateral (19033274)  Resolved  History of fracture of hand (9152939548): Onset in  at 42 years.  Resolved.  Comments:  2012 CDT 11:09 AM NOLANT - [place] , Susan Boxer's, right.   Family History:    Prostate carcinoma  Brother (Naveen)  Diabetes mellitus  Uncle (M)  Comments:  2012 11:03 AM [facility] - Filomena Gracia  X 2 uncles  Breast cancer  Sister (Pat, )  CA - Cancer of colon  Father ()  CAD - Coronary artery disease  Mother ()     Procedure history:    Polysomnogram (SNOMED CT 193745761) on 10/29/2018 at 68 Years.  Comments:  2019 3:34 PM FELICIA - Niki Cristobal CMA  AHI: 25.5  Diabetic retinal eye  "exam (SNOMED CT 3979954578) on 10/19/2017 at 67 Years.  Comments:  3/21/2018 6:16 PM CDT - Teri MIDDLETONArpan  No Diabetic retinopathy.  Myopia of both eyes with astigmatism and presbyopia  Colonoscopy (SNOMED CT 480248425) on 8/20/2015 at 64 Years.  Comments:  6/5/2019 1:05 PM NOLANT - Jennifer Brambila  Indication:  history of polyps.  Sedation:  Midazolam 2 mg, Fentanyl 100 mcg.  Impression:  Diverticulosis in sigmoid colon.  Exam otherwise normal.    Recommendation:  Repeat in 5 years.  Colonoscopy (SNOMED CT 821594471) on 11/17/2008 at 58 Years.  Comments:  6/5/2019 1:07 PM NOLANT - Jennifer Brambila  Indication:  History of polyps.  Sedation:  Midazolam 2 mg, Fentanyl 100 mcg.  Impression:  Two 3 mm polyps in recto-sigmoid colon.  Colonoscopy (SNOMED CT 027768319) in 2003 at 53 Years.  Tonsillectomy (SNOMED CT 025422145).  sebaceous cyst removal.  ganglion cyst removal.   Social History:        Alcohol Assessment            Past      Tobacco Assessment            Former smoker      Substance Abuse Assessment            Never      Employment and Education Assessment            Retired, Work/School description: [employer] (Part time).      Home and Environment Assessment            Marital status: .  Spouse/Partner name: Romina Arboleda.  Lives with Spouse.  1 children.      Nutrition and Health Assessment            Type of diet: Regular.      Exercise and Physical Activity Assessment            Exercise frequency: \"Activities at work 5x/week\".      Sexual Assessment            Sexually active: Yes.  Sexual orientation: Heterosexual.        Physical Examination   Vital Signs   7/28/2020 12:57 PM CDT Temperature Tympanic 98.8 DegF    Peripheral Pulse Rate 79 bpm    Systolic Blood Pressure 129 mmHg    Diastolic Blood Pressure 82 mmHg  HI    Mean Arterial Pressure 98 mmHg    BP Site Right arm    BP Method Electronic      Measurements from flowsheet : Measurements   7/28/2020 12:57 " PM CDT Height Measured - Standard 67.5 in    Weight Measured - Standard 252 lb    BSA 2.33 m2    Body Mass Index 38.88 kg/m2  HI      General:  Alert and oriented, No acute distress.    Eye:  Pupils are equal, round and reactive to light, Extraocular movements are intact.    HENT:  Normocephalic, Oral mucosa is moist.    Neck:  Supple, Non-tender, No carotid bruit, No jugular venous distention, No lymphadenopathy, No thyromegaly.    Respiratory:  Lungs are clear to auscultation, Respirations are non-labored, Breath sounds are equal.    Cardiovascular:  Normal rate, Regular rhythm, No murmur, Good pulses equal in all extremities, No edema.    Gastrointestinal:  Soft, Non-tender, Non-distended, Normal bowel sounds, No organomegaly.    Integumentary:  Warm, Dry.    Neurologic:  Alert, Oriented, No focal deficits, Cranial Nerves II-XII are grossly intact.    Psychiatric:  Cooperative, Appropriate mood & affect, Normal judgment.       Health Maintenance      Recommendations     Pending (in the next year)        OverDue           Exercise due  03/12/14  and every 1  year(s)           Preventative Services due  09/27/17  and every 5  year(s)           Alcohol Misuse Screen (Male) due  03/22/19  and every 1  year(s)           Depression Screen (Male) due  03/22/19  and every 1  year(s)           DM - Foot Exam due  03/22/19  and every 1  year(s)        Due            Abdominal Aortic Aneurysm Screen (if hx of smoking) due  07/28/20  One-time only           Fall Risk Screen (Male) due  07/28/20  and every 1  year(s)           Hepatitis C Screen 4944-8604 (Male) due  07/28/20  One-time only           Lung Cancer Screen (Male) due  07/28/20  and every 1  year(s)        Near Due            Influenza Vaccine near due  08/31/20  and every 1  year(s)           DM - HgbA1c near due  09/04/20  and every 3  month(s)        Refused            Zoster/Shingles Vaccine due  07/28/20  One-time only        Due In Future            Lipid  Disorders Screen (Male) not due until  01/16/21  and every 1  year(s)           DM - Microalbumin not due until  06/04/21  and every 1  year(s)           Type 2 Diabetes Mellitus Screen (Male) not due until  06/04/21  and every 1  year(s)           DM - Eye Exam not due until  06/29/21  and every 1  year(s)     Satisfied (in the past 1 year)        Satisfied            Body Mass Index Check (Male) on  07/28/20.           Body Mass Index Check (Male) on  01/16/20.           Body Mass Index Check (Male) on  10/04/19.           DM - Eye Exam on  06/29/20.           DM - Eye Exam on  06/29/20.           DM - Eye Exam on  06/29/20.           DM - HgbA1c on  06/04/20.           DM - HgbA1c on  01/16/20.           DM - HgbA1c on  10/04/19.           DM - Microalbumin on  06/04/20.           DM - Microalbumin on  06/04/20.           High Blood Pressure Screen (Male) on  07/28/20.           High Blood Pressure Screen (Male) on  06/04/20.           High Blood Pressure Screen (Male) on  01/16/20.           High Blood Pressure Screen (Male) on  10/04/19.           High Blood Pressure Screen (Male) on  10/03/19.           Influenza Vaccine on  01/16/20.           Lipid Disorders Screen (Male) on  01/16/20.           Lipid Disorders Screen (Male) on  01/16/20.           Lipid Disorders Screen (Male) on  01/16/20.           Lipid Disorders Screen (Male) on  01/16/20.           Lipid Disorders Screen (Male) on  01/16/20.           Pneumococcal Vaccine on  01/16/20.           Tobacco Use Screen (Male) on  07/28/20.           Tobacco Use Screen (Male) on  01/16/20.           Tobacco Use Screen (Male) on  10/04/19.           Tobacco Use Screen (Male) on  10/03/19.           Type 2 Diabetes Mellitus Screen (Male) on  06/04/20.           Type 2 Diabetes Mellitus Screen (Male) on  01/16/20.           Type 2 Diabetes Mellitus Screen (Male) on  10/04/19.          Review / Management   Results review      Impression and Plan   Diagnosis      Diabetes mellitus, type II (TTV39-QH E11.9).     Morbid Obesity (ONG70-FV E66.01).     BPH associated with nocturia (UKU56-IX N40.1).     History of colon polyps (MVE28-JY Z86.010).     Morbid Obesity (OJO00-GS E66.01).     Course:  Well controlled.    Plan:  With his diabetes we will add losartan low-dose and also help his borderline blood pressures.  Check a BMP in 1 month.  Encourage compliance.  He will need to have a colonoscopy set up.  See his back in 6 months otherwise.    6 mo fu  BMI,Weight loss,exercise,diet discussed   .    Patient Instructions:       Counseled: Patient, Regarding diagnosis, Regarding treatment, Regarding medications, Diet, Activity.

## 2022-02-15 NOTE — LETTER
(Inserted Image. Unable to display)     January 14, 2019      DAVID SULLIVAN  [address] [place], [place] [id number]          Dear ,      Thank you for selecting [facility] (previously [facility] for your healthcare needs.      Our records indicate you are due for the following services:     Sleep Apnea Follow up ~ It is recommended and possibly required by your insurance to see one of our sleep physicians, Dr. Donald Langford or Dr. León Ocampo, 30-90 days after starting treatment (PAP therapy) for sleep apnea.  To determine whether you are benefiting from PAP therapy, a download of your machine will be needed. We may be able to obtain the download remotely; however, to ensure this information will be available for your visit, please bring your CPAP machine SD card to your visit.    Appointments with our sleep physicians can be made by calling your primary clinic.        To schedule an appointment or if you have further questions, please contact your primary clinic:   [facility]       [phone]   Critical access hospital       [phone]              [facility]     [phone]      Powered by [employer]    Sincerely,    Emmett Langford MD, FACP

## 2022-02-15 NOTE — TELEPHONE ENCOUNTER
Entered by Liza Morillo on July 08, 2020 8:54:27 AM CDT  PCP:   KARIE    Medication:   Atorvastatin and Tamsulosin   Last Filled:  10/4/19   Quantity:  90 Refills:  1    Date of last office visit and reason:   1/16/20 Dm type 2   Date of last labs pertaining to condition:  _    Note:  Called pt at 0853. Notified pt that i could give him a 30 day supply of medication if he schedules a visit with [facility] for f/u. Patient stated he only needs Flomax filled at this time. Patient schedule 7/28/20 for f/u with [facility]     Return to Clinic order placed?  Yes.    Resource:   _  Phone:   _            ------------------------------------------  [facility]: [facility]  To: Victor M Gibbs MD  Sent: July 6, 2020 1:27:17 PM CDT  Subject: Medication Management  Due: June 24, 2020 10:12:23 PM CDT     ** On Hold Pending Signature **     Drug: atorvastatin (atorvastatin 10 mg oral tablet), TAKE ONE TABLET BY MOUTH AT BEDTIME  Quantity: 90 unknown unit  Days Supply: 90  Refills: 1  Substitutions Allowed  Notes from Pharmacy:     Dispensed Drug: atorvastatin (atorvastatin 10 mg oral tablet), TAKE ONE TABLET BY MOUTH AT BEDTIME  Quantity: 90 unknown unit  Days Supply: 90  Refills: 1  Substitutions Allowed  Notes from Pharmacy:     ** On Hold Pending Signature **     Drug: tamsulosin (tamsulosin 0.4 mg oral capsule), TAKE ONE CAPSULE BY MOUTH EVERY DAY  Quantity: 90 unknown unit  Days Supply: 90  Refills: 1  Substitutions Allowed  Notes from Pharmacy:     Dispensed Drug: tamsulosin (tamsulosin 0.4 mg oral capsule), TAKE ONE CAPSULE BY MOUTH EVERY DAY  Quantity: 90 unknown unit  Days Supply: 90  Refills: 1  Substitutions Allowed  Notes from Pharmacy:  ------------------------------------------  ** Submitted: **  Order:tamsulosin (tamsulosin 0.4 mg oral capsule)  1 cap(s)  Oral  daily  Qty:  30 cap(s)        Refills:  0          Substitutions Allowed     Route To Pharmacy - [facility] [place]    Signed  by Liaz Morillo  7/8/2020 1:54:00 PM [facility]    ** Submitted: **  Complete:tamsulosin (tamsulosin 0.4 mg oral capsule)   Signed by Liza Morillo  7/8/2020 1:54:00 PM [facility]    ** Not Approved:  **  tamsulosin (TAMSULOSIN HCL 0.4MG CAPS)  TAKE ONE CAPSULE BY MOUTH EVERY DAY  Qty:  90 unknown unit        Days Supply:  90        Refills:  1          Substitutions Allowed     Route To Pharmacy - [facility]   Signed by Liza Morillo---------------------  From: Liza Morillo   To: [facility]    Sent: 7/8/2020 8:55:23 AM CDT  Subject: Medication Management     ** Not Approved: Patient has enough at home. **  atorvastatin (ATORVASTATIN CALCIUM 10MG TABS)  TAKE ONE TABLET BY MOUTH AT BEDTIME  Qty:  90 unknown unit        Days Supply:  90        Refills:  1          Substitutions Allowed     Route To Pharmacy - [facility]   Signed by Liza Morillo

## 2022-02-15 NOTE — TELEPHONE ENCOUNTER
---------------------  From: Concepción Hilliard   Sent: 1/21/2021 10:29:37 AM CST  Subject: Chronic Disease Visit     Left message for patient to call back at: 10:28am regarding chronic disease visit and labs.  Due for visit and labs.  Was due for labs back in August after medication change made in July.

## 2022-02-15 NOTE — NURSING NOTE
Comprehensive Intake Entered On:  10/27/2021 9:02 AM CDT    Performed On:  10/27/2021 9:01 AM CDT by Neema Lopez CMA               Summary   Chief Complaint :   COVID exposure on Sunday; c/o runny nose, sneezing and scratchy throat; verbal consent given for telephone   Advance Directive :   No   Neema Lopez CMA - 10/27/2021 9:01 AM CDT   Health Status   Allergies Verified? :   Yes   Medication History Verified? :   Yes   Immunizations Current :   Other: Declines flu shot   Medical History Verified? :   Yes   Neema Lopez CMA - 10/27/2021 9:01 AM CDT   Consents   Consent for Immunization Exchange :   Consent Granted   Consent for Immunizations to Providers :   Consent Granted   Neema Lopez CMA - 10/27/2021 9:01 AM CDT   Meds / Allergies   (As Of: 10/27/2021 9:02:38 AM CDT)   Allergies (Active)   No Known Medication Allergies  Estimated Onset Date:   Unspecified ; Created By:   Tala Borden MA; Reaction Status:   Active ; Category:   Drug ; Substance:   No Known Medication Allergies ; Type:   Allergy ; Updated By:   Tala Borden MA; Reviewed Date:   10/27/2021 9:02 AM CDT        Medication List   (As Of: 10/27/2021 9:02:38 AM CDT)   Prescription/Discharge Order    Miscellaneous Rx Supply  :   Miscellaneous Rx Supply ; Status:   Prescribed ; Ordered As Mnemonic:   CPAP Machine and Supplies ; Simple Display Line:   See Instructions, Auto Titrating 4-20cm.  Heated humidifier, heated tubing, filters, full face mask of choice with headgear.  Replacement cushions and supplies as needed.   Months=99 (lifetime), 1 EA, 0 Refill(s) ; Ordering Provider:   Victor M Gibbs MD; Catalog Code:   Miscellaneous Rx Supply ; Order Dt/Tm:   3/4/2021 11:29:42 AM CST          Miscellaneous Prescription  :   Miscellaneous Prescription ; Status:   Prescribed ; Ordered As Mnemonic:   accu check smart view test strips ; Simple Display Line:   See Instructions, testing 2x/ day, 200 EA, 3 Refill(s) ; Ordering Provider:   Bernie HADDAD  Victor M; Catalog Code:   Miscellaneous Prescription ; Order Dt/Tm:   3/22/2018 2:16:54 PM CDT          Miscellaneous Prescription  :   Miscellaneous Prescription ; Status:   Prescribed ; Ordered As Mnemonic:   accu check fast clix lancets ; Simple Display Line:   See Instructions, testing 2x/ day, 100 EA, 1 Refill(s) ; Ordering Provider:   Victor M Gibbs MD; Catalog Code:   Miscellaneous Prescription ; Order Dt/Tm:   3/22/2018 2:16:19 PM CDT          losartan  :   losartan ; Status:   Prescribed ; Ordered As Mnemonic:   losartan 50 mg oral tablet ; Simple Display Line:   50 mg, 1 tab(s), Oral, daily, 90 tab(s), 1 Refill(s) ; Ordering Provider:   Jermaine Ramírez MD; Catalog Code:   losartan ; Order Dt/Tm:   9/16/2021 2:58:10 PM CDT          alfuzosin  :   alfuzosin ; Status:   Prescribed ; Ordered As Mnemonic:   Uroxatral 10 mg oral tablet, extended release ; Simple Display Line:   10 mg, 1 tab(s), Oral, daily, 90 tab(s), 1 Refill(s) ; Ordering Provider:   Jermaine Ramírez MD; Catalog Code:   alfuzosin ; Order Dt/Tm:   9/16/2021 2:58:10 PM CDT          dulaglutide  :   dulaglutide ; Status:   Prescribed ; Ordered As Mnemonic:   Trulicity Pen 1.5 mg/0.5 mL subcutaneous solution ; Simple Display Line:   1.5 mg, Subcutaneous, qweek, 6 mL, 1 Refill(s) ; Ordering Provider:   Jermaine Ramírez MD; Catalog Code:   dulaglutide ; Order Dt/Tm:   9/16/2021 3:02:54 PM CDT

## 2022-02-15 NOTE — LETTER
(Inserted Image. Unable to display)   April 21, 2021  DAVID SULLIVAN  [address] [place] [address]          Dear ,      Thank you for selecting [facility] for your healthcare needs.    Our records indicate you are due for the following services:     Clinical Support Staff (CSS)-Only Blood Pressure Check ~ Please stop in anytime to have your blood pressure rechecked. This is a free service and no appointment necessary.    So we can best determine if your medications are effective in lowering your blood pressure, please make sure your blood pressure medicine has been in your system for at least 1-2 hours prior to coming in.  We encourage you to avoid caffeine or other stimulants prior to having your blood pressure checked and come at a time when you are not feeling rushed.     If you check your blood pressure at home, please bring in your blood pressure monitor and home blood pressure readings.  We will check your machine for accuracy and also share your home readings with your Healthcare Provider.   (FYI   Regarding office visits: In some instances, a video visit or telephone visit may be offered as an option.)        To schedule an appointment or if you have further questions, please contact your clinic at [phone].      Powered by [employer]    Sincerely,    Victor M Gibbs M.D.

## 2022-02-15 NOTE — PROGRESS NOTES
Patient:   DAVID SULLIVAN            MRN: [id number]            FIN: [id number]               Age:   66 years     Sex:  Male     :  1950   Associated Diagnoses:   None   Author:   Abdelrahman Lara MD      Visit Information      Date of Service: 2017 01:29 pm  Performing Location: [facility]  Encounter#: [id number]      Primary Care Provider (PCP):  Abdelrahman Lara MD    NPI# [id number]      Referring Provider:  No referring provider recorded for selected visit.      Chief Complaint   3/17/2017 1:35 PM CDT    F/u labs.      History of Present Illness   Pt here to follow up on labs from last time. He reports continued issues with thirst and polyruia. Denies blurry vision. Does not check his blood sugars although he has a meter, no strips.        Review of Systems            Health Status   Allergies:    Allergic Reactions (Selected)  No known allergies   Medications:  (Selected)   Prescriptions  Prescribed  glimepiride 2 mg oral tablet: 1 tab(s) ( 2 mg ), PO, Daily, # 30 tab(s), 0 Refill(s), Type: Maintenance, Pharmacy: [id number], 1 tab(s) po daily  metFORMIN 500 mg oral tablet, extended release: 1 tab(s) ( 500 mg ), PO, Daily, # 30 tab(s), 0 Refill(s), Type: Maintenance, Pharmacy: [id number], 1 tab(s) po daily   Problem list:    All Problems (Selected)  Colon Polyps / ICD-9-.3 / Confirmed  Hypertriglyceridemia / ICD-9-.1 / Confirmed  Morbid Obesity / ICD-9-.01 / Probable  Diabetes mellitus, type II / SNOMED CT 108312477 / Confirmed      Histories   Past Medical History:    Active  Diabetes mellitus, type II (667805755): Onset on 2010 at 59 years.  Colon Polyps (211.3)  Resolved  Fracture of right hand (815.00): Onset in  at 41 years.  Resolved.  Comments:  2012 CDT 11:09 AM GRECIA - [place] , Susan Boxer's, right.   Family History:    Prostate carcinoma  Brother (Naveen)  Diabetes mellitus  Uncle (M)  Comments:  2012 11:03 AM Tamiko Gracia  , Filomena  X 2 uncles  Breast cancer  Sister (Pat, )  CA - Cancer of colon  Father ()  CAD - Coronary artery disease  Mother ()     Procedure history:    Colonoscopy (506055483) in  at 59 Years.  Colonoscopy (273239709) in  at 53 Years.  Tonsillectomy (888843759).  sebaceous cyst removal.  ganglion cyst removal.   Social History:        Alcohol Assessment: Denies Alcohol Use            Past      Tobacco Assessment: Denies Tobacco Use            Past      Substance Abuse Assessment: Denies Substance Abuse            Never      Employment and Education Assessment            Retired, Work/School description: [employer].      Home and Environment Assessment            Marital status: .  Spouse/Partner name: Romina Arboleda.  Lives with Spouse.  1 children.      Nutrition and Health Assessment            Type of diet: Regular.      Exercise and Physical Activity Assessment: Does not exercise            Exercise frequency: Never.      Sexual Assessment            Sexually active: Yes.  Sexual orientation: Heterosexual.        Physical Examination   Vital Signs   3/17/2017 1:35 PM CDT Temperature Tympanic 98.2 DegF    Peripheral Pulse Rate 82 bpm    Systolic Blood Pressure 120 mmHg    Diastolic Blood Pressure 86 mmHg    Mean Arterial Pressure 97 mmHg      Measurements from flowsheet : Measurements   3/17/2017 1:35 PM CDT Height Measured - Standard 67.5 in    Weight Measured - Standard 255 lb    BSA 2.34 m2    Body Mass Index 39.34 kg/m2      Gen - appears well  CV: RRR w/o MRG. Normal S1 and S2   Resp: Clear to auscultation b/l. Normal breath sounds without wheezes or crackles. No increased work of breathing.       Review / Management   Results review:  Lab results   3/17/2017 2:17 PM CDT Glucose Level 235 mg/dL  HI    UA Color YELLOW    UA Appear CLEAR    UA pH < OR = 5.0    UA Specific Gravity 1.026    UA Glucose 3+    UA Bilirubin NEGATIVE    UA Ketones NEGATIVE     UA Blood NEGATIVE    UA Protein NEGATIVE    UA Nitrite NEGATIVE    UA Leuk Est NEGATIVE    Lyme Ab IgG/IgM West Blot <0.90   2/16/2017 8:24 AM CST Sodium Level 135 mmol/L    Potassium Level 4.0 mmol/L    Chloride Level 101 mmol/L    CO2 Level 25 mmol/L    Glucose Level 245 mg/dL  HI    BUN 18 mg/dL    Creatinine 0.97 mg/dL    BUN/Creat Ratio NOT APPLICABLE    eGFR 82 mL/min/1.73m2    eGFR African American 95 mL/min/1.73m2    Calcium Level 9.4 mg/dL    Hgb A1c 9.8  HI    Cholesterol 230 mg/dL  HI    Non-  HI    HDL 27 mg/dL  LOW    Chol/HDL Ratio 8.5  HI    LDL See comment    LDL Direct 89 mg/dL    Triglyceride 728 mg/dL  HI    U Microalbumin 0.2 mg/dL    Ur Creatinine 78 mg/dL    Ur Microalb/Creat Ratio 3   .       Impression and Plan   Alfredo is a 67 y/o man w history of diabetes who has not had medical care for a couple of years - recently came in for labs and diabetes is poorly controlled.    Type II diabetes, hyperglycemia  - pt diagnosed with diabetes a few years ago. It was well controlled with lifestyle changes, he states, but lately he has been eating poorly, and has been suffering some hyperglycemic symptoms. Had labs done Feb 2017 for the first time in years and a1c was noted to be 9.8.   - random glucose today is 235, and ketonuria is absent  - we discussed starting insulin today and pt would really like to avoid that. I think he's right on the borderline where we should start it; with no ketonuria OK to try with orals.   - discussed lifestyle changes  - will start metformin 500 mg ER daily, discussed we will gradually try to increase this to 1000 mg BID over time  - will also add glimepride 2 mg daily to try to lower blood sugars more acutely.  Discussed need to eat after taking this.  - he will make an appointment with Teetee ANGELO  - f/u with me in 1-2 weeks  - will need ASA & stain as well will hold off on this for now as to not introduce too many meds at once.   - pt will start checking  sugars at home         Professional Services   Counseling Summary:  This was a 25 minute visit with greater than 50% of that time spent counseling the patient.    Counseling included abnormal lab results, treatment options, risks and benefits, lifestyle changes, and prognosis.

## 2022-02-15 NOTE — NURSING NOTE
Quick Intake Entered On:  6/13/2019 2:32 PM CDT    Performed On:  6/11/2019 2:32 PM CDT by Elva Strong               Summary   Advance Directive :   No   Weight Measured :   249 lb(Converted to: 249 lb 0 oz, 112.94 kg)    Height Measured :   67.5 in(Converted to: 5 ft 7 in, 171.45 cm)    Body Mass Index :   38.42 kg/m2 (HI)    Body Surface Area :   2.32 m2   Elva Strong - 6/13/2019 2:32 PM CDT

## 2022-02-15 NOTE — PROGRESS NOTES
Patient:   DAVID SULLIVAN            MRN: [id number]            FIN: [id number]               Age:   66 years     Sex:  Male     :  1950   Associated Diagnoses:   None   Author:   Abdelrahman Lara MD      Procedure   EKG procedure   Date:  2017.     EKG findings   Interpretation: Abdelrahman Lara MD.     Interpretation: no acute signs. sinus rhythm. ? old inferior infarct..

## 2022-02-15 NOTE — TELEPHONE ENCOUNTER
Entered by Pauline Soto CMA on July 16, 2020 12:03:01 PM CDT  ---------------------  From: Pauline Soto CMA   To: [facility]    Sent: 7/16/2020 12:03:01 PM CDT  Subject: Medication Management     ** Not Approved: Refill not appropriate, DARIUSZ on 7/8 for # 30-needs visit **  tamsulosin (TAMSULOSIN HCL 0.4MG CAPS)  TAKE ONE CAPSULE BY MOUTH EVERY DAY  Qty:  30 unknown unit        Days Supply:  30        Refills:  0          Substitutions Allowed     Route To Pharmacy - [facility]   Signed by Pauline Soto CMA            ------------------------------------------  From: [facility]  To: Victor M Gibbs MD  Sent: July 15, 2020 3:38:40 PM CDT  Subject: Medication Management  Due: June 24, 2020 10:12:23 PM CDT     ** On Hold Pending Signature **     Drug: tamsulosin (tamsulosin 0.4 mg oral capsule), TAKE ONE CAPSULE BY MOUTH EVERY DAY  Quantity: 30 unknown unit  Days Supply: 30  Refills: 0  Substitutions Allowed  Notes from Pharmacy:     Dispensed Drug: tamsulosin (tamsulosin 0.4 mg oral capsule), TAKE ONE CAPSULE BY MOUTH EVERY DAY  Quantity: 30 unknown unit  Days Supply: 30  Refills: 0  Substitutions Allowed  Notes from Pharmacy:  ------------------------------------------

## 2022-02-15 NOTE — LETTER
(Inserted Image. Unable to display)   April 10, 2019        DAVID SULLIVAN  [address]   [place], [place] [id number]        Dear ,      Thank you for selecting [facility] (previously [facility]) for your healthcare needs.    Our records indicate you are due for the following services:    Urine labs ~ Please be prepared to leave a urine specimen for evaluation  Fasting Lab Tests ~ Please do not eat or drink anything 10 hours prior to your scheduled appointment time.  (Water and any medications that you may need are allowed unless directed otherwise.)    If you had your labs done at another facility or with Direct Access Lab Testing at [facility], please bring in a copy of the results to your next visit, mail a copy, or drop off a copy of your results to your Healthcare Provider.  Diabetic Exam ~ Please bring your glucose meter and/or your blood glucose diary to your appointment.    You are due for lab work and an office visit, please schedule the lab appointment 1 week before the office visit.  This will assure all results are available to discuss with your provider during your visit.    **It is very helpful if you bring your medication bottles to your appointment.  This assures we have all of your current medications, including strength and dosing information, documented accurately in your medical record.    To schedule an appointment or if you have further questions, please contact your primary clinic:   [facility]       [phone]   [facility]       [phone]              [facility]     [phone]      Powered by [employer]    Sincerely,    Victor M Gibbs M.D.

## 2022-02-15 NOTE — LETTER
(Inserted Image. Unable to display)     January 29, 2021      DAVID SULLIVAN  [address]   [place], [place] [id number]          Dear ,      Thank you for selecting [facility] (previously [facility]) for your healthcare needs.    Our records indicate you are due for the following services:    Diabetic Exam ~ Please bring your glucose meter and/or your blood glucose diary to your appointment.  Urine Labs ~ Please be prepared to leave a urine specimen for evaluation.  Fasting Lab Tests ~ Please do not eat or drink anything 10 hours prior to your scheduled appointment time.  (Water and any medications that you may need are allowed unless directed otherwise.)    If you had your labs done at another facility or with Direct Access Lab Testing at [facility], please bring in a copy of the results to your next visit, mail a copy, or drop off a copy of your results to your Healthcare Provider.    (FYI   Regarding office visits: In some instances, a video visit or telephone visit may be offered as an option.)    You are due for lab work and an office visit; please schedule the lab appointment 1 week before the office visit.  This will assure all results are available to discuss with your Healthcare Provider during your visit.    **It is very helpful if you bring your medication bottles to your appointment.  This assures we have all of your current medications, including strength and dosing information, documented accurately in your medical record.    To schedule an appointment or if you have further questions, please contact your clinic at [phone].      Powered by [employer]    Sincerely,    Victor M Gibbs MD

## 2022-02-15 NOTE — TELEPHONE ENCOUNTER
"---------------------  From: Elva Strong   To: [facility] Message Pool ([id number]);     Cc: Victor M Gibbs MD;      Sent: 4/8/2021 8:59:08 AM CDT  Subject: General Message     FYI - no action needed    Pt did receive first 4.5mg weekly dose of Trulicity and was then clarified to go to 3mg first so he has bother prescriptions at home.  He is wondering if he can exchange the higher dose.  Recommended complete the 3mg prescription fill and evaluate glucose readings.  Pt may benefit from going to the 4.5mg dose in the future and just to keep the rx at home refrigerated.      Pt also believes the MD stopped his atorvastatin because his cholesterol was \"good\" I clarified MD did not stop his medication and that pt c/o fatigue, muscle/ joint aches which is why it is \"on hold\" and that should be restarted as able.  Briefly discussed dietary recommendations for cholesterol management.      Pt is overdue for educator visit.    RTC entered x 3 mo---------------------  From: Concepción Hilliard ([facility] Message Pool ([id number]))   To: Victor M Gibbs MD;     Sent: 4/8/2021 9:13:52 AM CDT  Subject: FW: General Message  "

## 2022-02-15 NOTE — PROGRESS NOTES
Patient:   DAVID SULLIVAN            MRN: [id number]            FIN: [id number]               Age:   66 years     Sex:  Male     :  1950   Associated Diagnoses:   None   Author:   Abdelrahman Lara MD      Visit Information      Date of Service: 02/15/2017 01:49 pm  Performing Location: [facility]  Encounter#: [id number]      Primary Care Provider (PCP):  Gaudencio Lopez MD    NPI# [id number]      Referring Provider:  No referring provider recorded for selected visit.      Chief Complaint   2/15/2017 1:58 PM CST    Pt here for px.      History of Present Illness   Pt presents for a check up. He has a history of type 2 diabetes, which hasn't been checked in the last few years. He states that his a1c generally has been always at goal without medications. He was diagnosed several years ago. He had an eye exam a few months ago and denies any significant eye complaints. Denies neuropathy. Denies chest pain or shortness of breath. He does not check his blood sugar at home. We discussed diet and exercise and he acknowledges improvements could be made in both areas. He has noticed some increased thirst and polyuria. He has noticed increased fatigue. He falls asleep on the couch at home, then transitions to bed at some point in the night, then wakes up earliy in the AM, and takes a nap late rin the day. He would like to be tested for sleep apnea. HE is unsure about snoreing. No witnessed apnea but he sleeps alone. Just excessive daytime sleepiness.      Review of Systems         Complete 10 point ROS was negative except for HPI      Health Status   Allergies:    Allergic Reactions (Selected)  No known allergies   Problem list:    All Problems (Selected)  Colon Polyps / ICD-9-.3 / Confirmed  Hypertriglyceridemia / ICD-9-.1 / Confirmed  Morbid Obesity / ICD-9-.01 / Probable  Diabetes mellitus, type II / SNOMED CT 413758874 / Confirmed      Histories   Past Medical History:    Active  Diabetes  mellitus, type II (256318922): Onset on 2010 at 59 years.  Colon Polyps (211.3)  Resolved  Fracture of right hand (815.00): Onset in  at 41 years.  Resolved.  Comments:  2012 CDT 11:09 AM CDT - Basil  Filomena  Boxer's, right.   Family History:    Prostate carcinoma  Brother (Naveen)  Diabetes mellitus  Uncle (M)  Comments:  2012 11:03 AM - Filomena Gracia  X 2 uncles  Breast cancer  Sister (Elo, )  CA - Cancer of colon  Father ()  CAD - Coronary artery disease  Mother ()     Procedure history:    Colonoscopy (003126672) in  at 59 Years.  Colonoscopy (711226283) in  at 53 Years.  Tonsillectomy (681073375).  sebaceous cyst removal.  ganglion cyst removal.   Social History:        Alcohol Assessment: Denies Alcohol Use            Past      Tobacco Assessment: Denies Tobacco Use            Past      Substance Abuse Assessment: Denies Substance Abuse            Never      Employment and Education Assessment            Retired, Work/School description: [employer].      Home and Environment Assessment            Marital status: .  Spouse/Partner name: Romina Arboleda.  Lives with Spouse.  1 children.      Nutrition and Health Assessment            Type of diet: Regular.      Exercise and Physical Activity Assessment: Does not exercise            Exercise frequency: Never.      Sexual Assessment            Sexually active: Yes.  Sexual orientation: Heterosexual.        Physical Examination   Vital Signs   2/15/2017 1:58 PM CST Temperature Tympanic 98.3 DegF    Peripheral Pulse Rate 88 bpm    Systolic Blood Pressure 128 mmHg    Diastolic Blood Pressure 86 mmHg    Mean Arterial Pressure 100 mmHg      Measurements from flowsheet : Measurements   2/15/2017 1:58 PM CST Height Measured - Standard 67.5 in    Weight Measured - Standard 255 lb    BSA 2.34 m2    Body Mass Index 39.34 kg/m2      General:  Alert and oriented, No acute distress.    Eye:  Pupils  are equal, round and reactive to light, Extraocular movements are intact, Normal conjunctiva.    HENT:  Tympanic membranes are clear, Normal hearing, Oral mucosa is moist, No pharyngeal erythema.    Neck:  Supple, No lymphadenopathy, No thyromegaly.    Respiratory:  Lungs are clear to auscultation, Respirations are non-labored, Breath sounds are equal, Symmetrical chest wall expansion.    Cardiovascular:  Normal rate, Regular rhythm, No murmur, No gallop, Good pulses equal in all extremities, Normal peripheral perfusion, No edema.    Gastrointestinal:  Soft, Non-tender, Non-distended.    Musculoskeletal:  Normal range of motion, Normal gait.    Integumentary:  Warm, Dry, Has a 2 mm nevus on his forehead.    Neurologic:  No focal deficits, Normal deep tendon reflexes.    Psychiatric:  Cooperative, Appropriate mood & affect, Normal judgment.       Impression and Plan   Preventative care  - CRC screening: states last colonoscopy was 1-2 years ago; polyps were found and he was told to come back in 5 years  - CV risk - check lipids today. May require statin. Advised dialy aspirin  - prostate cancer screening - has brother in late 70s with a non-aggressive form. Advised against screening for now. Pt will think about it.  - up to date on vaccinations  - discussed healthy eating and exercise.    Type 2 diabetes  - check BMP, a1c and urine micro     Fatigue  - discussed sleep hygiene  - will order sleep study    ADDENEDUM - called pt back with results - high a1c and blodo gulcose. Reocmmended follow up visit next week to discuss initiation of medication.

## 2022-02-15 NOTE — NURSING NOTE
Hearing and Vision Screening Entered On:  5/22/2019 10:01 AM CDT    Performed On:  5/22/2019 10:00 AM CDT by Concepción Hilliard               Hearing and Vision Screening   Hearing Screen Comments :   Hearing Aides   Concepción Hilliard - 5/22/2019 10:00 AM CDT

## 2022-02-15 NOTE — PROGRESS NOTES
Patient:   DAVID SULLIVAN            MRN: [id number]            FIN: [id number]               Age:   70 years     Sex:  Male     :  1950   Associated Diagnoses:   Diabetes mellitus, type II; Morbid Obesity; BPH associated with nocturia; History of colon polyps   Author:   Victor M Gibbs MD      Visit Information      Date of Service: 2021 10:33 am  Performing Location: [facility]  Encounter#: [id number]      Primary Care Provider (PCP):  Victor M Gibbs MD# [id number]      Referring Provider:  iVctor M Gibbs MD# [id number]      Chief Complaint   3/4/2021 10:36 AM CST    Chronic disease visit.      History of Present Illness   Patient is here for 6-month follow-up.  Overall his main complaint is tired and fatigued on his Flomax.  He is not checking his sugars but is using his Trulicity every week.  His not using his CPAP he is sent it back sometime ago.             Review of Systems   Constitutional:  Negative except as documented in history of present illness.    Eye:  Negative.    Ear/Nose/Mouth/Throat:  Negative.    Respiratory:  Negative.    Cardiovascular:  Negative.    Gastrointestinal:  Negative.    Genitourinary:  Negative.    Hematology/Lymphatics:  Negative.    Endocrine:  Negative.    Immunologic:  Negative.    Musculoskeletal:  Negative.    Integumentary:  Negative.    Neurologic:  Negative.       Health Status   Allergies:    Allergic Reactions (Selected)  No Known Medication Allergies   Medications:  (Selected)   Prescriptions  Prescribed  Auto Titrating CPAP 6-16 for daily use: Auto Titrating CPAP 6-16 for daily use, See Instructions, Instructions: Heated humidifier x1; Humidifier chamber x1;  Heated tubing x1; Full face mask of choice with headgear  x1; Cushion x 1;  Filters: Disposable x1pk & Reusable x1pk.  Length of Need...  Trulicity Pen 4.5 mg/0.5 mL subcutaneous solution: See Instructions, Instructions: take 4.4 mg sq weekly, # 12 EA, 1 Refill(s),  Type: Maintenance, Pharmacy: [facility], take 4.4 mg sq weekly, 67.5, in, 03/04/21 10:36:00 CST, Height Measured, 256.2, lb, 03/04/21 10:36:00 CST, Weight Measured...  Uroxatral 10 mg oral tablet, extended release: = 1 tab(s) ( 10 mg ), Oral, daily, # 90 tab(s), 1 Refill(s), Type: Maintenance, Pharmacy: [facility], 1 tab(s) Oral daily, 67.5, in, 03/04/21 10:36:00 CST, Height Measured, 256.2, lb, 03/04/21 10:36:00 CST, Weight Measured  accu check fast clix lancets: accu check fast clix lancets, See Instructions, Instructions: testing 2x/ day, Supply, # 100 EA, 1 Refill(s), Type: Maintenance, Pharmacy: [id number], testing 2x/ day  accu check smart view test strips: accu check smart view test strips, See Instructions, Instructions: testing 2x/ day, Supply, # 200 EA, 3 Refill(s), Type: Maintenance, Pharmacy: [facility] [id number], testing 2x/ day  atorvastatin 10 mg oral tablet: = 1 tab(s) ( 10 mg ), po, qhs, # 90 tab(s), 1 Refill(s), Type: Maintenance, Pharmacy: [facility], 1 tab(s) Oral qhs, 67.5, in, 07/28/20 12:57:00 CDT, Height Measured, 252, lb, 07/28/20 12:57:00 CDT, Weight Measured  losartan 50 mg oral tablet: = 1 tab(s) ( 50 mg ), Oral, daily, # 90 tab(s), 1 Refill(s), Type: Maintenance, Pharmacy: [facility], 1 tab(s) Oral daily, 67.5, in, 03/04/21 10:36:00 CST, Height Measured, 256.2, lb, 03/04/21 10:36:00 CST, Weight Measured,    Medications          *denotes recorded medication          accu check fast clix lancets: See Instructions, testing 2x/ day, 100 EA, 1 Refill(s).          accu check smart view test strips: See Instructions, testing 2x/ day, 200 EA, 3 Refill(s).          Auto Titrating CPAP 6-16 for daily use: See Instructions, Heated humidifier x1; Humidifier chamber x1;  Heated tubing x1; Full face mask of choice with headgear  x1; Cushion x 1;  Filters: Disposable x1pk & Reusable x1pk.  Length of Need = 99 Months, 1 EA,  11 Refill(s).          Uroxatral 10 mg oral tablet, extended release: 10 mg, 1 tab(s), Oral, daily, 90 tab(s), 1 Refill(s).          atorvastatin 10 mg oral tablet: 10 mg, 1 tab(s), po, qhs, 90 tab(s), 1 Refill(s).          Trulicity Pen 4.5 mg/0.5 mL subcutaneous solution: See Instructions, take 4.4 mg sq weekly, 12 EA, 1 Refill(s).          losartan 50 mg oral tablet: 50 mg, 1 tab(s), Oral, daily, 90 tab(s), 1 Refill(s).       Problem list:    All Problems (Selected)  Astigmatism of both eyes with presbyopia / SNOMED CT 680061015 / Confirmed  BPH associated with nocturia / SNOMED CT 5000732444 / Confirmed  History of colon polyps / SNOMED CT 8169984723 / Confirmed  Hypertriglyceridemia / SNOMED CT 508767063 / Confirmed  Morbid obesity / SNOMED CT 242936643 / Probable  Nuclear sclerotic cataract of both eyes / SNOMED CT 122353518 / Confirmed  Obstructive sleep apnea syndrome, moderate / SNOMED CT 137675748 / Confirmed  Ptosis of eyelid, bilateral / SNOMED CT 84557127 / Confirmed  Diabetes mellitus, type II / SNOMED CT 531564779 / Confirmed      Histories   Past Medical History:    Active  Diabetes mellitus, type II (653804631): Onset on 2010 at 59 years.  Morbid obesity (827981848)  Hypertriglyceridemia (969579654)  BPH associated with nocturia (8717194558)  Astigmatism of both eyes with presbyopia (192958459)  Nuclear sclerotic cataract of both eyes (699823375)  Ptosis of eyelid, bilateral (94480710)  Resolved  History of fracture of hand (5404962689): Onset in  at 42 years.  Resolved.  Comments:  2012 CDT 11:09 AM [facility] - [place] , Susan Boxer's, right.   Family History:    Prostate carcinoma  Brother (Naveen)  Diabetes mellitus  Uncle (M)  Comments:  2012 11:03 AM [facility] - Filomena Gracai  X 2 uncles  Breast cancer  Sister (Pat, )  CA - Cancer of colon  Father ()  CAD - Coronary artery disease  Mother ()     Procedure history:    Polysomnogram (SNOMED CT 382322048) on  "10/29/2018 at 68 Years.  Comments:  2/12/2019 3:34 PM CST - Susu Niki [place]  AHI: 25.5  Diabetic retinal eye exam (SNOMED CT 9080905781) on 10/19/2017 at 67 Years.  Comments:  3/21/2018 6:16 PM CDT - Teri Arpan [place]  No Diabetic retinopathy.  Myopia of both eyes with astigmatism and presbyopia  Colonoscopy (SNOMED CT 444774111) on 8/20/2015 at 64 Years.  Comments:  6/5/2019 1:05 PM CDT - Jennifer Brambila  Indication:  history of polyps.  Sedation:  Midazolam 2 mg, Fentanyl 100 mcg.  Impression:  Diverticulosis in sigmoid colon.  Exam otherwise normal.    Recommendation:  Repeat in 5 years.  Colonoscopy (SNOMED CT 386018920) on 11/17/2008 at 58 Years.  Comments:  6/5/2019 1:07 PM CDT - Jennifer Brambila  Indication:  History of polyps.  Sedation:  Midazolam 2 mg, Fentanyl 100 mcg.  Impression:  Two 3 mm polyps in recto-sigmoid colon.  Colonoscopy (SNOMED CT 746207180) in 2003 at 53 Years.  Tonsillectomy (SNOMED CT 402419509).  sebaceous cyst removal.  ganglion cyst removal.   Social History:        Electronic Cigarette/Vaping Assessment            Electronic Cigarette Use: Never.      Alcohol Assessment            Past      Tobacco Assessment            Former smoker, quit more than 30 days ago      Substance Abuse Assessment            Never      Employment and Education Assessment            Retired, Work/School description: [employer] - [employer] (Part time).      Home and Environment Assessment            Marital status: .  Spouse/Partner name: Romina Arboleda.  Lives with Spouse.  1 children.      Nutrition and Health Assessment            Type of diet: Regular.      Exercise and Physical Activity Assessment            Exercise frequency: \"Activities at work 5x/week\".      Sexual Assessment            Sexually active: Yes.  Sexual orientation: Heterosexual.        Physical Examination   Vital Signs   3/4/2021 10:36 AM CST Temperature Tympanic 97.3 DegF  LOW    Peripheral " Pulse Rate 77 bpm    HR Method Electronic    Systolic Blood Pressure 144 mmHg  HI    Diastolic Blood Pressure 74 mmHg    Mean Arterial Pressure 97 mmHg    BP Method Electronic      Measurements from flowsheet : Measurements   3/4/2021 10:36 AM CST Height Measured - Standard 67.5 in    Weight Measured - Standard 256.2 lb    BSA 2.35 m2    Body Mass Index 39.53 kg/m2  HI      General:  Alert and oriented, No acute distress.    Eye:  Pupils are equal, round and reactive to light, Extraocular movements are intact.    HENT:  Normocephalic.    Neck:  Supple, Non-tender, No carotid bruit, No jugular venous distention, No lymphadenopathy, No thyromegaly.    Respiratory:  Lungs are clear to auscultation, Respirations are non-labored, Breath sounds are equal.    Cardiovascular:  Normal rate, Regular rhythm, No murmur, Good pulses equal in all extremities, No edema.    Gastrointestinal:  Soft, Non-tender, Non-distended, Normal bowel sounds, No organomegaly.    Integumentary:  Warm, Dry.    Neurologic:  Alert, Oriented, No focal deficits, Cranial Nerves II-XII are grossly intact.    Psychiatric:  Cooperative, Appropriate mood & affect, Normal judgment.       Health Maintenance      Recommendations     Pending (in the next year)        OverDue           Exercise due  03/12/14  and every 1  year(s)           Preventative Services due  09/27/17  and every 5  year(s)           Alcohol Misuse Screen due  03/22/19  and every 1  year(s)           Depression Screen (Male) due  03/22/19  and every 1  year(s)           DM - Foot Exam due  03/22/19  and every 1  year(s)           Influenza Vaccine due  09/01/20  and every 1  year(s)        Due            Abdominal Aortic Aneurysm Screen (if hx of smoking) due  03/04/21  One-time only           Fall Risk Screen (Male) due  03/04/21  and every 1  year(s)           Hepatitis C Screen 9741-3130 (Male) due  03/04/21  One-time only           Lung Cancer Screen (Male) due  03/04/21  and every 1   year(s)        Refused            Zoster/Shingles Vaccine due  03/04/21  One-time only        Due In Future            DM - HgbA1c not due until  05/25/21  and every 3  month(s)           DM - Microalbumin not due until  06/04/21  and every 1  year(s)           DM - Eye Exam not due until  06/29/21  and every 1  year(s)           Lipid Disorders Screen (Male) not due until  02/25/22  and every 1  year(s)           Type 2 Diabetes Mellitus Screen (Male) not due until  02/25/22  and every 1  year(s)     Satisfied (in the past 1 year)        Satisfied            Body Mass Index Check on  03/04/21.           Body Mass Index Check on  07/28/20.           DM - Eye Exam on  06/29/20.           DM - Eye Exam on  06/29/20.           DM - Eye Exam on  06/29/20.           DM - HgbA1c on  02/25/21.           DM - HgbA1c on  06/04/20.           DM - Microalbumin on  06/04/20.           DM - Microalbumin on  06/04/20.           High Blood Pressure Screen (Male) on  03/04/21.           High Blood Pressure Screen (Male) on  07/28/20.           High Blood Pressure Screen (Male) on  06/04/20.           Lipid Disorders Screen (Male) on  02/25/21.           Lipid Disorders Screen (Male) on  02/25/21.           Lipid Disorders Screen (Male) on  02/25/21.           Lipid Disorders Screen (Male) on  02/25/21.           Tobacco Use Screen (Male) on  03/04/21.           Tobacco Use Screen (Male) on  07/28/20.           Type 2 Diabetes Mellitus Screen (Male) on  02/25/21.           Type 2 Diabetes Mellitus Screen (Male) on  06/04/20.          Review / Management   Results review:  Lab results   2/25/2021 8:23 AM CST Sodium Level 139 mmol/L    Potassium Level 4.1 mmol/L    Chloride Level 103 mmol/L    CO2 Level 27 mmol/L    Glucose Level 180 mg/dL  HI    BUN 17 mg/dL    Creatinine Level 1.01 mg/dL    BUN/Creat Ratio NOT APPLICABLE    eGFR 76 mL/min/1.73m2    eGFR African American 88 mL/min/1.73m2    Calcium Level 9.2 mg/dL    Hgb A1c 7.9  HI     Cholesterol 133 mg/dL    Non-HDL Cholesterol 101    HDL 32 mg/dL  LOW    Cholesterol/HDL Ratio 4.2    LDL 66    Triglyceride 263 mg/dL  HI    PSA 3.2 ng/mL   .       Impression and Plan   Diagnosis     Diabetes mellitus, type II (DZV89-UR E11.9).     Morbid Obesity (FLP63-DN E66.01).     BPH associated with nocturia (JLH33-LU N40.1).     History of colon polyps (LKG96-WM Z86.010).     Morbid Obesity (BVO23-MD E66.01).     Course:  Well controlled.    Plan:  1.  Adult onset diabetes with compliance issues.  We will increase his Trulicity encourage compliance with diabetic educator follow-up recheck in 6 months he is up-to-date on his eye checks  2.  Severe sleep apnea he is willing to retry the CPAP  3.  Hypertension suboptimally controlled we will increase his losartan he needs a follow-up lab in a month  4.  Hyperlipidemia on atorvastatin  5.  BPH with symptoms he is not tolerating the Flomax will switch to Uroxatrol  6.  History of colon polyps he gets his colonoscopies at [facility] he is getting 1 this year     .    Patient Instructions:       Counseled: Patient, Regarding diagnosis, Regarding treatment, Regarding medications, Diet, Activity.

## 2022-02-15 NOTE — TELEPHONE ENCOUNTER
---------------------  From: Arabella Urbina LPN (Phone Messages Pool ([id number]_[facility]))   To: hospitals Message Pool ([id number]_[place] [facility] [place]);     Sent: 2/26/2021 10:52:19 AM CST  Subject: Trulicity clarification     Phone Message    PCP:   KARIE      Time of Call:  9:43am       [facility]  [facility] number:  [phone]    Note:   Jordan MCLEOD stating they received Rx for Trulicity 3mg quantity 4.     Jordan is asking if this is 4 pens or 4mL.    Jordan says it is billed through insurance 2mL for a 4 pen box. He is asking if this is suppose to be a 2 month supply with a quantity of 4 or 4 pens to equal a 1 month supply.    Asking for call back with clarification or send new Rx with clear instructions.    Last office visit and reason:  7/28/20 diabetes type 2

## 2022-02-15 NOTE — PROGRESS NOTES
Patient:   DAVID SULLIVAN            MRN: [id number]            FIN: [id number]               Age:   66 years     Sex:  Male     :  1950   Associated Diagnoses:   Diabetes mellitus; Plantar fasciitis   Author:   Abdlerahman Lara MD      Visit Information      Date of Service: 2017 10:18 am  Performing Location: [facility]  Encounter#: [id number]      Primary Care Provider (PCP):  Abdelrahman Lara MD    NPI# [id number]      Chief Complaint   2017 10:25 AM CDT   DM check.        History of Present Illness             The patient presents for follow-up evaluation of diabetes.  The quality of the patient's diabetes is described as decreased hyperglycemic episodes.  Relieving factors consist of diet changes and increased activity.  Associated symptoms consist of fatigue.  Compliance problems: none.  Hemoglobin A1c results: elevated.  Lifestyle modification: weight reduction, diet.  Complaint: R foot pain x 2 wks. no trauma. Worst first thign in the morning. Not improving .        Review of Systems   Constitutional:  No fever.    Respiratory:  No shortness of breath.    Cardiovascular:  No chest pain.       Health Status   Medications:  (Selected)   Prescriptions  Prescribed  Trulicity Pen 1.5 mg/0.5 mL subcutaneous solution: ( 1.5 mg ), subcutaneous, qweek, Instructions: weekly injection   rotate injection sites, # 4 EA, 3 Refill(s), Type: Maintenance, Pharmacy: [id number], pt will have discount card, 1.5 mg subcutaneous qweek,Instr:weekly injection ; rotate in...  accu check fast clix lancets: accu check fast clix lancets, See Instructions, Instructions: testing 2x/ day, Supply, # 1 box(es), 3 Refill(s), Type: Maintenance, Pharmacy: [id number], testing 2x/ day  accu check smart view test strips: accu check smart view test strips, See Instructions, Instructions: testing 2x/ day, Supply, # 200 EA, 3 Refill(s), Type: Maintenance, Pharmacy: [id number], testing  "2x/ day  atorvastatin 10 mg oral tablet: 1 tab(s) ( 10 mg ), po, qhs, # 90 tab(s), 3 Refill(s), Type: Maintenance, Pharmacy: [id number], 1 tab(s) po qhs  Documented Medications  Documented  aspirin 81 mg oral tablet: 1 tab(s) ( 81 mg ), po, daily, 0 Refill(s), Type: Maintenance   Problem list:    All Problems (Selected)  History of chicken pox / SNOMED CT 302547896 / Confirmed  History of colon polyps / SNOMED CT 3703436945 / Confirmed  Hypertriglyceridemia / SNOMED CT 062857808 / Confirmed  Morbid Obesity / ICD-9-.01 / Probable  Diabetes mellitus, type II / SNOMED CT 657159422 / Confirmed      Histories   Family History:    Prostate carcinoma  Brother (Naveen)  Diabetes mellitus  Uncle (MOLLY)  Comments:  2012 11:03 AM - Filomena Gracia 2 uncles  Breast cancer  Sister (Pat, )  CA - Cancer of colon  Father ()  CAD - Coronary artery disease  Mother ()     Social History:        Alcohol Assessment: Past            Past      Tobacco Assessment: Past            Former smoker      Substance Abuse Assessment: Denies Substance Abuse            Never      Employment and Education Assessment            Retired, Work/School description: [employer] - [employer] (Part time).      Home and Environment Assessment            Marital status: .  Spouse/Partner name: Romina Arboleda.  Lives with Spouse.  1 children.      Nutrition and Health Assessment            Type of diet: Regular.      Exercise and Physical Activity Assessment: Regular exercise            Exercise frequency: \"Activities at work 5x/week\".      Sexual Assessment            Sexually active: Yes.  Sexual orientation: Heterosexual.        Physical Examination   Vital Signs   2017 10:25 AM CDT Temperature Tympanic 97.5 DegF  LOW    Peripheral Pulse Rate 75 bpm    Systolic Blood Pressure 120 mmHg    Diastolic Blood Pressure 76 mmHg    Mean Arterial Pressure 91 mmHg      Measurements from " flowsheet : Measurements   6/20/2017 10:25 AM CDT Height Measured - Standard 67.5 in    Weight Measured - Standard 246 lb    BSA 2.3 m2    Body Mass Index 37.96 kg/m2      General:  Alert and oriented, No acute distress.    Feet:  Normal by visual exam, Tender medial heel., flexible flat foot mild.    Psychiatric:  Cooperative, Appropriate mood & affect.       Impression and Plan   Diagnosis     Diabetes mellitus (ZEJ54-AK E11.9).     Course:  Improving.    Summary:  a1c - rechecking today, suspect will be close to goal as BG have been running 120s-150  current treatment - trulicity once weekly  intolerances - metformin - diarrhea, weakness  large vessel disease - has been on asa. Will start low dose statin and increase to high intensity statin if he tolerates.  small vessel disease - foot exam was done last visit. Referred to ophtho. most recent urine micro OK  immunizations - pneumovax done tdoay.    Orders     Orders (Selected)   Outpatient Orders  Ordered  Return to Clinic (Request): RFV: diabetes check, Return in 3 months  Ordered (In Transit)  Hemoglobin A1c* ([facility]): Specimen Type: Blood, Collection Date: 06/20/17 10:49:00 CDT  Completed  Referral (Request): 06/20/17 10:44:00 CDT, Referred to: Ophthalmology, Referred to: Dr Zaman if OK with patient's insurance, Reason for referral: Eye check - diabetes., Priority: Routine, Diabetes mellitus  pneumococcal 23-polyvalent vaccine: 0.5 mL, im, once  Prescriptions  Prescribed  atorvastatin 10 mg oral tablet: 1 tab(s) ( 10 mg ), po, qhs, # 90 tab(s), 3 Refill(s), Type: Maintenance, Pharmacy: [id number], 1 tab(s) po qhs.     Diagnosis     Plantar fasciitis (VPS00-ZG M72.2).     Plan:  will have him wear arch supports, do rehab exercises, rest, ice, wear good supportive shoes at all times, use plantar fasciitis sock.

## 2022-02-15 NOTE — TELEPHONE ENCOUNTER
Entered by Aisha Yousif CMA on January 10, 2020 11:28:37 AM CST  ---------------------  From: Aisha Yousif CMA   To: [facility]    Sent: 1/10/2020 11:28:37 AM CST  Subject: Medication Management     ** Not Approved: not on med **  dulaglutide (TRULICITY 1.5MG/0.5ML SOPN)  INJECT 1.5 MG UNDER THE SKIN EVERY WEEK  Qty:  2 mL        Days Supply:  28        Refills:  6          Substitutions Allowed     Route To Pharmacy - [facility]   Signed by Aisha Yousif CMA            ------------------------------------------  From: [facility]  To: Victor M Gibbs MD  Sent: January 9, 2020 3:33:40 PM CST  Subject: Medication Management  Due: January 10, 2020 3:33:40 PM CST    ** On Hold Pending Signature **  Drug: dulaglutide (Trulicity Pen 1.5 mg/0.5 mL subcutaneous solution)  INJECT 1.5 MG UNDER THE SKIN EVERY WEEK  Quantity: 2 mL  Days Supply: 28  Refills: 6  Substitutions Allowed  Notes from Pharmacy:     Dispensed Drug: dulaglutide (Trulicity Pen 1.5 mg/0.5 mL subcutaneous solution)  INJECT 1.5 MG UNDER THE SKIN EVERY WEEK  Quantity: 2 mL  Days Supply: 28  Refills: 6  Substitutions Allowed  Notes from Pharmacy:   ------------------------------------------cost too much per 10/4/19 office visit note. Due this month for a repeat A1C.

## 2022-02-15 NOTE — TELEPHONE ENCOUNTER
---------------------  From: Rashmi Dozier RN   Sent: 9/14/2021 1:31:29 PM CDT  Subject: Medication Refill     Pt called requesting a refill of Afluzosin medication stating that he will be out before his appointment.  Nurse asked pt if they could send in a 7 day to get him through to his appointment and then he could get the 90 day refill at that time.  Pt agreed that this would work out just fine.  Pt informed that a 7 day was sent to [facility] and he would need to keep his appointment on 9/15/21 to get more refills.

## 2022-02-15 NOTE — PROGRESS NOTES
"   Patient:   DAVID SULLIVAN            MRN: [id number]            FIN: [id number]               Age:   66 years     Sex:  Male     :  1950   Associated Diagnoses:   Diabetes mellitus, type II; Obesity (BMI 30-39.9); Hypertriglyceridemia   Author:   Elva Strong      Visit Information   Diabetes Self Management Education    Referral source:  Abdelrahman Lara MD.       Chief Complaint   Uncontrolled DM Type II, Obesity, Hyperlipidemia      History of Present Illness   Pt diagnosed with Type II diabetes 2010 and was seen by RD 2/22/10 and follow up 3/25/10 - pt has not seen an educator since that time.  Weight at dx 291#.    Pt did change clinics for a couple of years and he is unsure of his A1C's - during that time.    Pt seen by MD 2/15/17 and 3/17/17.    A1C 9.8% = 235mg/dL     Discussed nutrition, diabetes, and lifestyle management with pt  Nutrition:  Pt states he had let his diet slide and reverted back to drinking regular soda and frequent intake of sweets, not monitoring carbohydate intake.  Now since finding out elevated A1C pt has been starting to improve intake.    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 [employer]   Stress:   low   Sleep: tired during the day - planning on having a sleep study  Support: wife  Insulin:  none at this time   BG Testing: pt does not know where is meter is \"donated\", meter and instruction today   DM related medication: metformin ER increasing to goal dose of 4 tabs daily 2000mg; starting Trulicity and will suspend glimepiride  Routine diabetes care:  eye exam UTD, will discuss during follow up consults        Review of Systems            Health Status   Allergies:    Allergic Reactions (Selected)  No known allergies   Medications:  (Selected)   Prescriptions  Prescribed  Trulicity Pen 1.5 mg/0.5 mL subcutaneous solution: ( 1.5 mg ), subcutaneous, " qweek, Instructions: weekly injection   rotate injection sites, # 4 EA, 3 Refill(s), Type: Maintenance, Pharmacy: [id number], pt will have discount card, 1.5 mg subcutaneous qweek,Instr:weekly injection ; rotate in...  accu check fast clix lancets: accu check fast clix lancets, See Instructions, Instructions: testing 2x/ day, Supply, # 1 box(es), 3 Refill(s), Type: Maintenance, Pharmacy: [id number], testing 2x/ day  accu check smart view test strips: accu check smart view test strips, See Instructions, Instructions: testing 2x/ day, Supply, # 200 EA, 3 Refill(s), Type: Maintenance, Pharmacy: [id number], testing 2x/ day  metFORMIN 500 mg oral tablet, extended release: 4 tab(s) ( 2,000 mg ), PO, Daily, Instructions: increasing weekly to reach 4 tabs   with evening meal, # 360 tab(s), 0 Refill(s), Type: Maintenance, Pharmacy: [id number], 4 tab(s) po daily,Instr:increasing weekly to reach 4 tabs ; with even...  Suspended  glimepiride 2 mg oral tablet: 1 tab(s) ( 2 mg ), PO, Daily, # 30 tab(s), 0 Refill(s), Type: Maintenance, Pharmacy: [id number], 1 tab(s) po daily   Problem list:    All Problems (Selected)  Colon Polyps / ICD-9-.3 / Confirmed  Hypertriglyceridemia / ICD-9-.1 / Confirmed  Morbid Obesity / ICD-9-.01 / Probable  Diabetes mellitus, type II / SNOMED CT 121496617 / Confirmed      Histories   Past Medical History:    Active  Diabetes mellitus, type II (612289882): Onset on 2010 at 59 years.  Colon Polyps (211.3)  Resolved  Fracture of right hand (815.00): Onset in  at 41 years.  Resolved.  Comments:  2012 CDT 11:09 AM [facility] - [facility] , Susan Boxer's, right.   Family History:    Prostate carcinoma  Brother (Naveen)  Diabetes mellitus  Uncle (M)  Comments:  2012 11:03 AM - [facility] , Filomena  X 2 uncles  Breast cancer  Sister (Pat, )  CA - Cancer of colon  Father ()  CAD - Coronary artery disease  Mother  ()     Procedure history:    Colonoscopy (SNOMED CT 363978877) in  at 59 Years.  Colonoscopy (SNOMED CT 161011935) in  at 53 Years.  Tonsillectomy (SNOMED CT 378632211).  sebaceous cyst removal.  ganglion cyst removal.   Social History:        Alcohol Assessment: Denies Alcohol Use            Past      Tobacco Assessment: Denies Tobacco Use            Past      Substance Abuse Assessment: Denies Substance Abuse            Never      Employment and Education Assessment            Retired, Work/School description: [employer].      Home and Environment Assessment            Marital status: .  Spouse/Partner name: Romina Arboleda.  Lives with Spouse.  1 children.      Nutrition and Health Assessment            Type of diet: Regular.      Exercise and Physical Activity Assessment: Does not exercise            Exercise frequency: Never.      Sexual Assessment            Sexually active: Yes.  Sexual orientation: Heterosexual.        Physical Examination   Measurements from flowsheet : Measurements   2017 11:54 AM CDT Height Measured - Standard 67.5 in    Weight Measured - Standard 256 lb    BSA 2.35 m2    Body Mass Index 39.5 kg/m2         Health Maintenance      Recommendations     Pending (in the next year)        OverDue           Exercise due  14  and every 1  year(s)        Due            Lung Cancer Screen (Male) due  17  and every 1  year(s)        Near Due            DM - HgbA1c near due  17  and every 3  month(s)        Refused            DM - Eye Exam due  17  and every 1  year(s)           DM - Foot Exam due  17  and every 1  year(s)           Influenza Vaccine due  17  and every 1  year(s)           Zoster/Shingles Vaccine due  17  One-time only        Due In Future            Preventative Services not due until  17  and every 5  year(s)           DM - Microalbumin not due until  18  and every 1  year(s)            Lipid Disorders Screen (Male) not due until  02/16/18  and every 1  year(s)           Alcohol Misuse Screen (Male) not due until  02/20/18  and every 1  year(s)           Depression Screen (Male) not due until  02/20/18  and every 1  year(s)           Body Mass Index Check (Male) not due until  03/17/18  and every 1  year(s)           High Blood Pressure Screen (Male) not due until  03/17/18  and every 1  year(s)           Tobacco Use Screen (Male) not due until  03/17/18  and every 1  year(s)     Satisfied (in the past 1 year)        Satisfied            Alcohol Misuse Screen (Male) on  02/20/17.           Body Mass Index Check (Male) on  03/17/17.           Body Mass Index Check (Male) on  02/15/17.           DM - HgbA1c on  02/16/17.           DM - Microalbumin on  02/16/17.           DM - Microalbumin on  02/16/17.           Depression Screen (Male) on  02/20/17.           Depression Screen (Male) on  02/20/17.           Depression Screen (Male) on  02/20/17.           High Blood Pressure Screen (Male) on  03/17/17.           High Blood Pressure Screen (Male) on  02/15/17.           Lipid Disorders Screen (Male) on  02/16/17.           Lipid Disorders Screen (Male) on  02/16/17.           Lipid Disorders Screen (Male) on  02/16/17.           Lipid Disorders Screen (Male) on  02/16/17.           Lipid Disorders Screen (Male) on  02/16/17.           Tobacco Use Screen (Male) on  03/17/17.           Tobacco Use Screen (Male) on  02/15/17.           Tobacco Use Screen (Male) on  08/05/16.        Review / Management   Results review:  Lab results   3/17/2017 2:17 PM CDT Glucose Level 235 mg/dL  HI    UA Color YELLOW    UA Appear CLEAR    UA pH < OR = 5.0    UA Specific Gravity 1.026    UA Glucose 3+    UA Bilirubin NEGATIVE    UA Ketones NEGATIVE    UA Blood NEGATIVE    UA Protein NEGATIVE    UA Nitrite NEGATIVE    UA Leuk Est NEGATIVE    Lyme Ab IgG/IgM West Blot <0.90   2/16/2017 8:24 AM CST Sodium Level 135  mmol/L    Potassium Level 4.0 mmol/L    Chloride Level 101 mmol/L    CO2 Level 25 mmol/L    Glucose Level 245 mg/dL  HI    BUN 18 mg/dL    Creatinine 0.97 mg/dL    BUN/Creat Ratio NOT APPLICABLE    eGFR 82 mL/min/1.73m2    eGFR African American 95 mL/min/1.73m2    Calcium Level 9.4 mg/dL    Hgb A1c 9.8  HI    Cholesterol 230 mg/dL  HI    Non-  HI    HDL 27 mg/dL  LOW    Chol/HDL Ratio 8.5  HI    LDL See comment    LDL Direct 89 mg/dL    Triglyceride 728 mg/dL  HI    U Microalbumin 0.2 mg/dL    Ur Creatinine 78 mg/dL    Ur Microalb/Creat Ratio 3   .       Impression and Plan   Diagnosis     Diabetes mellitus, type II (UHO56-IN E11.9).     Obesity (BMI 30-39.9) (SAC49-WA E66.9).     Hypertriglyceridemia (MQX18-VZ E78.1).       Counseled: Patient, SUKHJINDER Self Care Behaviors   Today the patient was instructed on the basic physiology of diabetes mellitus, BG testing, medication management, and lifestyle guidelines.  Healthy Eating - Education on what foods are primary sources of carbohydrates and how intake affects BG readings, edu on carbohydrate counting, reading food labels, appropriate portion sizes, well balanced meals, diabetic plate method as a general rule.  Patient is provided with numerous ideas to incorporate dietary recommendations, meal planning and preparation, mindful eating techniques and weight loss tips.    Being Active - Education on how exercise helps with :    - lowering BG by increasing the muscles ability to take up and use glucose   - weight loss   - healthier heart (improve lipid profile)   - improve sleep, mood, energy   - decrease stress      Monitoring - Today the patient is instructed on home blood glucose monitoring.  Pt is provided with a meter.  Pt is instructed on the proper use of the meter, recommended testing schedule and blood glucose target levels.  The patient is able to return appropriate demonstration of the use of the meter.  Pt is instructed on proper disposal of lancets.     Taking Medication - Will increase Metformin - education on the mechanism of action, Today the patient is instructed on Trulicity: proper use, mechanism of action, indications, dosing, injection schedule, safety and side effects.  Pt is shown a demonstration of a injection and was able to successfully demonstrate accurate injection technique without difficulty.  Patient was provided with 2 week of samples and startup package for references.  Reviewed s/sx of hypoglycemia and treatment protocol.     Problem Solving - medical support team assistance, resources provided (written and apps, internet); good control of stress  Healthy Coping - support of friends, family, and medical support team   Reducing Risks - Will discuss at f/u apts.  Weight loss goal of 7 - 10% of current body weight pounds as a reasonable goal to help increase insulin sensitivity   .      Goals:   1.  Practice healthy stress management and get good quality sleep with the goal of 7-8 hours per night.    2.   Physical activity: Recommend increasing to 2 hrs and 30 min a week (150 minutes) of moderate-intensity, or 1 hr and 15 min (75 minutes) a week of vigorous-intensity aerobic physical activity, or an equivalent combination of moderate- and vigorous-intensity aerobic physical activity.  Aerobic activity should be performed in episodes of at least 10 minutes, preferably spread throughout the week.  Muscle-strengthening activities on 2 or more days per week.    3.  Eat in a healthy way, per diabetic plate method.  Nutrition reference: Eat 3 meals a day; snacks are optional.  A meal is three or more food groups; make it colorful for better nutrition.    4.  Total Carbohydrate intake 30 grams for meals, snacks ~ 15 grams  5.  Pt to monitor BG BID.  BG goals: Fasting and before meals 80 - 120 mg/dL, 2 hrs after the start of a meal 80 - 140 mg/dL.    6.  Goal weight 235 by 10/2017   7.  Read handouts provided.  F/u in 6 weeks       Professional Services    Time spent with pt 60 min   cc Dr. Abdelrahman Lara

## 2022-02-15 NOTE — LETTER
(Inserted Image. Unable to display)   May 14, 2019      DAVID SULLIVAN      [address] [place], [place] [id number]        Dear ,    Thank you for selecting [facility] for your healthcare needs.  Below you will find the results of the recent tests done at our clinic.    We will discuss this at your next visit.      Result Name Current Result Previous Result Reference Range   Potassium Level (mmol/L)  4.2 5/13/2019  4.7 3/22/2018 3.5 - 5.3   Glucose Level (mg/dL) ((H)) 140 5/13/2019 ((H)) 104 3/22/2018 65 - 99   Creatinine Level (mg/dL)  0.91 5/13/2019  0.97 3/22/2018 0.70 - 1.25   Hgb A1c ((H)) 7.2 5/13/2019 ((H)) 6.9 10/2/2018  - <5.7   Cholesterol (mg/dL)  153 5/13/2019 ((H)) 200 3/22/2018  - <200   HDL (mg/dL) ((L)) 36 5/13/2019 ((L)) 28 3/22/2018 >40 -    LDL  80 5/13/2019  See comment 3/22/2018    Triglyceride (mg/dL) ((H)) 280 5/13/2019 ((H)) 549 3/22/2018  - <150   Ur Microalbumin/Creatinine Ratio  3 5/13/2019  3 3/22/2018  - <30       Please contact me or my assistant at [phone] if you have any questions.     Sincerely,        Victor M Gibbs MD        What do your labs mean?  Below is a glossary of commonly ordered labs:  LDL   Bad Cholesterol   HDL   Good Cholesterol  AST/ALT   Liver Function   Cr/Creatinine   Kidney Function  Microalbumin   Kidney Function  BUN   Kidney Function  PSA   Prostate    TSH   Thyroid Hormone  HgbA1c   Diabetes Test   Hgb (Hemoglobin)   Red Blood Cells

## 2022-02-15 NOTE — TELEPHONE ENCOUNTER
---------------------  From: Venkata Kaplan LPN   To: [facility] Message Pool ([address] [place] [address]);     Sent: 11/5/2021 10:55:08 AM CDT  Subject: General Message     Pt inquiring about monoclonal antibody treatment.  Says he is taking ivermectin at this time.Please send to Team Responsible.---------------------  From: Jermaine Ramírez MD ([facility] Message Pool ([address] [place] [address]))   To: Phone Messages Pool ([address] [place] [address]);     Sent: 11/5/2021 1:16:23 PM CDT  Subject: RE: General Message---------------------  From: Arabella Urbina LPN (Phone Messages Pool ([address] [place] [address]))   To: [facility] Message Pool ([address] [place] [address]);     Sent: 11/5/2021 1:41:42 PM CDT  Subject: FW: General Message     Pt seen [facility] 10/27 for covid symptoms.  Pt had positive test - tested 11/2/21  Please advise if he qualifies for monocolonal antibodies---------------------  From: eNema Lopez CMA ([facility] Message Pool ([address] [place] [address]))   To: Eldon Gallegos PA-C;     Sent: 11/5/2021 1:50:34 PM CDT  Subject: FW: General Message---------------------  From: Eldon Gallegos PA-C   To: [facility] Message Pool ([address] [place] [address]);     Sent: 11/5/2021 2:08:15 PM CDT  Subject: RE: General Message     I did call him. It looks like he meets criteria to be considered.  I reviewed the information sheet with him. I filled this out and faxed to [facility].    [facility]

## 2022-02-15 NOTE — PROGRESS NOTES
Patient:   DAVID SULLIVAN            MRN: [id number]            FIN: [id number]               Age:   69 years     Sex:  Male     :  1950   Associated Diagnoses:   Diabetes mellitus, type II; Morbid Obesity   Author:   Victor M Gibbs MD      Visit Information      Date of Service: 2020 10:48 am  Performing Location: [facility]  Encounter#: [id number]      Primary Care Provider (PCP):  Victor M Gibbs MD    NPI# [id number]      Referring Provider:  Victor M Gibbs MD    NPI# [id number]      Chief Complaint   2020 10:53 AM CST   Patient would like to discuss the Glipizide. C/o weakness and fatigue. Requesting Trulicity        History of Present Illness   Patient with follow-up visit for his diabetes.  He has a likely feels in the glipizide would like to go back to [facility] he notes he thinks he cannot figure out how to make it work price wise.  He has no other complaints or problems worse at this point.         Review of Systems   Constitutional:  Negative.    Eye:  Negative.    Ear/Nose/Mouth/Throat:  Negative.    Respiratory:  Negative.    Cardiovascular:  Negative.    Gastrointestinal:  Negative.    Genitourinary:  Negative.    Hematology/Lymphatics:  Negative.    Endocrine:  Negative.    Immunologic:  Negative.    Musculoskeletal:  Negative.    Integumentary:  Negative.    Neurologic:  Negative.       Health Status   Allergies:    Allergic Reactions (Selected)  No Known Medication Allergies   Medications:  (Selected)   Prescriptions  Prescribed  Auto Titrating CPAP 6-16 for daily use: Auto Titrating CPAP 6-16 for daily use, See Instructions, Instructions: Heated humidifier x1; Humidifier chamber x1;  Heated tubing x1; Full face mask of choice with headgear  x1; Cushion x 1;  Filters: Disposable x1pk & Reusable x1pk.  Length of Need...  Trulicity Pen 1.5 mg/0.5 mL subcutaneous solution: ( 1.5 mg ), Subcutaneous, qweek, # 4 EA, 11 Refill(s), Type: Maintenance, [facility], 1.5 mg Subcutaneous qweek  accu check fast clix lancets: accu check fast clix lancets, See Instructions, Instructions: testing 2x/ day, Supply, # 100 EA, 1 Refill(s), Type: Maintenance, Pharmacy: [facility] [id number], testing 2x/ day  accu check smart view test strips: accu check smart view test strips, See Instructions, Instructions: testing 2x/ day, Supply, # 200 EA, 3 Refill(s), Type: Maintenance, Pharmacy: [facility] [employer] [facility] [id number], testing 2x/ day  atorvastatin 10 mg oral tablet: = 1 tab(s) ( 10 mg ), po, qhs, # 90 tab(s), 1 Refill(s), Type: Maintenance, Pharmacy: [facility], 1 tab(s) Oral qhs  tamsulosin 0.4 mg oral capsule: = 1 cap(s) ( 0.4 mg ), PO, Daily, # 90 cap(s), 1 Refill(s), Type: Maintenance, Pharmacy: [facility], 1 cap(s) Oral daily,    Medications          *denotes recorded medication          accu check fast clix lancets: See Instructions, testing 2x/ day, 100 EA, 1 Refill(s).          accu check smart view test strips: See Instructions, testing 2x/ day, 200 EA, 3 Refill(s).          Auto Titrating CPAP 6-16 for daily use: See Instructions, Heated humidifier x1; Humidifier chamber x1;  Heated tubing x1; Full face mask of choice with headgear  x1; Cushion x 1;  Filters: Disposable x1pk & Reusable x1pk.  Length of Need = 99 Months, 1 EA, 11 Refill(s).          atorvastatin 10 mg oral tablet: 10 mg, 1 tab(s), po, qhs, 90 tab(s), 1 Refill(s).          Trulicity Pen 1.5 mg/0.5 mL subcutaneous solution: 1.5 mg, Subcutaneous, qweek, 4 EA, 11 Refill(s).          tamsulosin 0.4 mg oral capsule: 0.4 mg, 1 cap(s), PO, Daily, 90 cap(s), 1 Refill(s).       Problem list:    All Problems  BPH associated with nocturia / SNOMED CT 7321594164 / Confirmed  History of colon polyps / SNOMED CT 7467017006 / Confirmed  Hypertriglyceridemia / SNOMED CT 138088214 / Confirmed  Morbid obesity / SNOMED CT 914360447 / Probable  Obstructive  sleep apnea syndrome, moderate / SNOMED CT 233299959 / Confirmed  Diabetes mellitus, type II / SNOMED CT 651736421 / Confirmed  Inactive: History of chicken pox / SNOMED CT 573058009  Resolved: History of fracture of hand / SNOMED CT 8942519741  Canceled: Colon Polyps / ICD-9-.3      Histories   Past Medical History:    Active  Diabetes mellitus, type II (501822085): Onset on 2010 at 59 years.  Morbid obesity (237098910)  Hypertriglyceridemia (977188725)  BPH associated with nocturia (0646187643)  Resolved  History of fracture of hand (5997212234): Onset in  at 42 years.  Resolved.  Comments:  2012 CDT 11:09 AM CDT - [place] , Susan Boxer's, right.   Family History:    Prostate carcinoma  Brother (Naveen)  Diabetes mellitus  Uncle (M)  Comments:  2012 11:03 AM CDT - Filomena Gracia  X 2 uncles  Breast cancer  Sister (Pat, )  CA - Cancer of colon  Father ()  CAD - Coronary artery disease  Mother ()     Procedure history:    Polysomnogram (SNOMED CT 359092577) on 10/29/2018 at 68 Years.  Comments:  2019 3:34 PM CST - Niki Cristobal CMA  AHI: 25.5  Diabetic retinal eye exam (SNOMED CT 0289384886) on 10/19/2017 at 67 Years.  Comments:  3/21/2018 6:16 PM CDT - Arpan Williamson CMA  No Diabetic retinopathy.  Myopia of both eyes with astigmatism and presbyopia  Colonoscopy (SNOMED CT 104467517) on 2015 at 64 Years.  Comments:  2019 1:05 PM CDT - Jennifer Brambila  Indication:  history of polyps.  Sedation:  Midazolam 2 mg, Fentanyl 100 mcg.  Impression:  Diverticulosis in sigmoid colon.  Exam otherwise normal.    Recommendation:  Repeat in 5 years.  Colonoscopy (SNOMED CT 020562490) on 2008 at 58 Years.  Comments:  2019 1:07 PM CDT - Jennifer Brambila  Indication:  History of polyps.  Sedation:  Midazolam 2 mg, Fentanyl 100 mcg.  Impression:  Two 3 mm polyps in recto-sigmoid colon.  Colonoscopy (SNOMED CT 238580811) in  at 53 Years.  Tonsillectomy  "(SNOMED CT 771515511).  sebaceous cyst removal.  ganglion cyst removal.   Social History:        Alcohol Assessment            Past      Tobacco Assessment            Former smoker      Substance Abuse Assessment            Never      Employment and Education Assessment            Retired, Work/School description: [employer] (Part time).      Home and Environment Assessment            Marital status: .  Spouse/Partner name: Romina Arboleda.  Lives with Spouse.  1 children.      Nutrition and Health Assessment            Type of diet: Regular.      Exercise and Physical Activity Assessment            Exercise frequency: \"Activities at work 5x/week\".      Sexual Assessment            Sexually active: Yes.  Sexual orientation: Heterosexual.        Physical Examination   Vital Signs   1/16/2020 10:53 AM CST Temperature Tympanic 97.7 DegF  LOW    Peripheral Pulse Rate 70 bpm    Systolic Blood Pressure 150 mmHg  HI    Diastolic Blood Pressure 82 mmHg  HI    Mean Arterial Pressure 105 mmHg    BP Site Left arm    BP Method Electronic    Oxygen Saturation 97 %      Measurements from flowsheet : Measurements   1/16/2020 10:53 AM CST Height Measured - Standard 67.5 in    Weight Measured - Standard 256 lb    BSA 2.35 m2    Body Mass Index 39.5 kg/m2  HI      General:  Alert and oriented, No acute distress.    Eye:  Pupils are equal, round and reactive to light, Extraocular movements are intact.    HENT:  Normocephalic, Oral mucosa is moist.    Neck:  Supple, Non-tender, No carotid bruit, No jugular venous distention, No lymphadenopathy, No thyromegaly.    Respiratory:  Lungs are clear to auscultation, Respirations are non-labored, Breath sounds are equal.    Cardiovascular:  Normal rate, Regular rhythm, No murmur, Good pulses equal in all extremities, No edema.    Gastrointestinal:  Soft, Non-tender, Non-distended, Normal bowel sounds, No organomegaly.    Integumentary:  Warm, Dry. "    Neurologic:  Alert, Oriented, No focal deficits, Cranial Nerves II-XII are grossly intact.    Psychiatric:  Cooperative, Appropriate mood & affect, Normal judgment.       Health Maintenance      Recommendations     Pending (in the next year)        OverDue           Exercise due  03/12/14  and every 1  year(s)           Preventative Services due  09/27/17  and every 5  year(s)           Alcohol Misuse Screen (Male) due  03/22/19  and every 1  year(s)           Depression Screen (Male) due  03/22/19  and every 1  year(s)           DM - Foot Exam due  03/22/19  and every 1  year(s)        Due            DM - HgbA1c due  01/04/20  and every 3  month(s)           Abdominal Aortic Aneurysm Screen (if hx of smoking) due  01/16/20  One-time only           Fall Risk Screen (Male) due  01/16/20  and every 1  year(s)           Hepatitis C Screen [id number] (Male) due  01/16/20  One-time only           Lung Cancer Screen (Male) due  01/16/20  and every 1  year(s)        Near Due            DM - Eye Exam near due  02/26/20  and every 1  year(s)        Refused            Influenza Vaccine due  08/31/19  and every 1  year(s)           Zoster/Shingles Vaccine due  01/16/20  One-time only        Due In Future            DM - Microalbumin not due until  05/13/20  and every 1  year(s)           Lipid Disorders Screen (Male) not due until  05/13/20  and every 1  year(s)           Type 2 Diabetes Mellitus Screen (Male) not due until  10/04/20  and every 1  year(s)     Satisfied (in the past 1 year)        Satisfied            Body Mass Index Check (Male) on  01/16/20.           Body Mass Index Check (Male) on  10/04/19.           Body Mass Index Check (Male) on  06/11/19.           Body Mass Index Check (Male) on  05/22/19.           DM - Eye Exam on  02/26/19.           DM - HgbA1c on  10/04/19.           DM - HgbA1c on  05/13/19.           DM - Microalbumin on  05/13/19.           DM - Microalbumin on  05/13/19.           High  Blood Pressure Screen (Male) on  01/16/20.           High Blood Pressure Screen (Male) on  10/04/19.           High Blood Pressure Screen (Male) on  10/03/19.           High Blood Pressure Screen (Male) on  05/22/19.           Lipid Disorders Screen (Male) on  05/13/19.           Lipid Disorders Screen (Male) on  05/13/19.           Lipid Disorders Screen (Male) on  05/13/19.           Lipid Disorders Screen (Male) on  05/13/19.           Tobacco Use Screen (Male) on  01/16/20.           Tobacco Use Screen (Male) on  10/04/19.           Tobacco Use Screen (Male) on  10/03/19.           Tobacco Use Screen (Male) on  05/22/19.           Type 2 Diabetes Mellitus Screen (Male) on  10/04/19.           Type 2 Diabetes Mellitus Screen (Male) on  05/13/19.          Review / Management   Results review      Impression and Plan   Diagnosis     Diabetes mellitus, type II (FKJ03-QN E11.9).     Morbid Obesity (ZLJ66-GR E66.01).     Course:  Not well controlled.    Plan:  We will stop glipizide add Trulicity back in 3-month follow-up for labs to track blood pressures    6 mo fu  BMI,Weight loss,exercise,diet discussed   .    Patient Instructions:       Counseled: Patient, Regarding diagnosis, Regarding treatment, Regarding medications, Diet, Activity.

## 2022-02-15 NOTE — NURSING NOTE
Comprehensive Intake Entered On:  7/28/2020 1:04 PM CDT    Performed On:  7/28/2020 12:57 PM CDT by Ambika Gill CMA               Summary   Chief Complaint :   Chronic disease management   Advance Directive :   No   Weight Measured :   252 lb(Converted to: 252 lb 0 oz, 114.31 kg)    Height Measured :   67.5 in(Converted to: 5 ft 7 in, 171.45 cm)    Body Mass Index :   38.88 kg/m2 (HI)    Body Surface Area :   2.33 m2   Systolic Blood Pressure :   129 mmHg   Diastolic Blood Pressure :   82 mmHg (HI)    Mean Arterial Pressure :   98 mmHg   Peripheral Pulse Rate :   79 bpm   BP Site :   Right arm   BP Method :   Electronic   Temperature Tympanic :   98.8 DegF(Converted to: 37.1 DegC)    Ambika Gill CMA - 7/28/2020 12:57 PM CDT   Health Status   Allergies Verified? :   Yes   Medication History Verified? :   Yes   Immunizations Current :   Other: Declines flu shot   Medical History Verified? :   Yes   Pre-Visit Planning Status :   Completed   Tobacco Use? :   Former smoker   Ambika Gill CMA - 7/28/2020 12:57 PM CDT   Consents   Consent for Immunization Exchange :   Consent Granted   Consent for Immunizations to Providers :   Consent Granted   Ambika Gill CMA - 7/28/2020 12:57 PM CDT   Meds / Allergies   (As Of: 7/28/2020 1:04:16 PM CDT)   Allergies (Active)   No Known Medication Allergies  Estimated Onset Date:   Unspecified ; Created By:   Tala Borden MA; Reaction Status:   Active ; Category:   Drug ; Substance:   No Known Medication Allergies ; Type:   Allergy ; Updated By:   Tala Borden MA; Reviewed Date:   7/28/2020 12:59 PM CDT        Medication List   (As Of: 7/28/2020 1:04:16 PM CDT)   Prescription/Discharge Order    atorvastatin  :   atorvastatin ; Status:   Prescribed ; Ordered As Mnemonic:   atorvastatin 10 mg oral tablet ; Simple Display Line:   10 mg, 1 tab(s), po, qhs, 90 tab(s), 1 Refill(s) ; Ordering Provider:   Victor M Gibbs MD; Catalog Code:   atorvastatin ; Order Dt/Tm:   10/4/2019  2:24:24 PM CDT          dulaglutide  :   dulaglutide ; Status:   Prescribed ; Ordered As Mnemonic:   Trulicity Pen 1.5 mg/0.5 mL subcutaneous solution ; Simple Display Line:   1.5 mg, Subcutaneous, qweek, 4 EA, 11 Refill(s) ; Ordering Provider:   Victor M Gibbs MD; Catalog Code:   dulaglutide ; Order Dt/Tm:   1/16/2020 11:05:47 AM CST          Miscellaneous Prescription  :   Miscellaneous Prescription ; Status:   Prescribed ; Ordered As Mnemonic:   accu check fast clix lancets ; Simple Display Line:   See Instructions, testing 2x/ day, 100 EA, 1 Refill(s) ; Ordering Provider:   Victor M Gibbs MD; Catalog Code:   Miscellaneous Prescription ; Order Dt/Tm:   3/22/2018 2:16:19 PM CDT          Miscellaneous Prescription  :   Miscellaneous Prescription ; Status:   Prescribed ; Ordered As Mnemonic:   accu check smart view test strips ; Simple Display Line:   See Instructions, testing 2x/ day, 200 EA, 3 Refill(s) ; Ordering Provider:   Victor M Gibbs MD; Catalog Code:   Miscellaneous Prescription ; Order Dt/Tm:   3/22/2018 2:16:54 PM CDT          Miscellaneous Rx Supply  :   Miscellaneous Rx Supply ; Status:   Prescribed ; Ordered As Mnemonic:   Auto Titrating CPAP 6-16 for daily use ; Simple Display Line:   See Instructions, Heated humidifier x1; Humidifier chamber x1;  Heated tubing x1; Full face mask of choice with headgear  x1; Cushion x 1;  Filters: Disposable x1pk & Reusable x1pk.  Length of Need = 99 Months, 1 EA, 11 Refill(s) ; Ordering Provider:   Emmett Langford MD; Catalog Code:   Miscellaneous Rx Supply ; Order Dt/Tm:   10/29/2019 9:56:18 AM CDT          tamsulosin  :   tamsulosin ; Status:   Prescribed ; Ordered As Mnemonic:   tamsulosin 0.4 mg oral capsule ; Simple Display Line:   1 cap(s), Oral, daily, 30 cap(s), 0 Refill(s) ; Ordering Provider:   Victor M Gibbs MD; Catalog Code:   tamsulosin ; Order Dt/Tm:   7/8/2020 8:54:35 AM CDT            ID Risk Screen   Recent Travel History :   No  recent travel   Family Member Travel History :   No recent travel   Other Exposure to Infectious Disease :   Unknown   Ambika Gill CMA - 7/28/2020 12:57 PM CDT

## 2022-02-15 NOTE — TELEPHONE ENCOUNTER
---------------------  From: Tawnya Morales CMA (Phone [place] ([id number]))   To: Phone Messages Pool ([id number]_[place] - [place]);     Sent: 10/24/2019 5:08:25 PM CDT  Subject: CPAP       Phone Message    PCP:  ARLENE      Time of Call:  12:12       Person Calling:  [facility]; [place]  Phone number:  [phone]  Fax: same    Time returned call:  LMTCB 1:23    Note:  Patient now has new insurance (Medicare) and needs to be recertified for CPAP.  Nohemi has requested the signed notes of patient's last visit and prescription for a CPAP machine.  Per ARLENE last note patient had a machine but has not used it.      I left a message for a return call to verify if patient needs a new machine or a Rx stating he needs to use a CPAP and supplies.      Transferred to: phone pool for call backSee other message.

## 2022-02-15 NOTE — PROGRESS NOTES
Patient:   DAVID SULLIVAN            MRN: [id number]            FIN: [id number]               Age:   70 years     Sex:  Male     :  1950   Associated Diagnoses:   Diabetes mellitus, type II; Hypertriglyceridemia; Hypertension goal BP (blood pressure) < 130/80; BPH associated with nocturia   Author:   Jermaine Ramírez MD      Chief Complaint   2021 2:28 PM CDT    DM/HTN f/u and refills.      History of Present Illness   Patient here for diabetes follow up:  Current A1c is: due  Goal for patient is less than 7.0  Last Fasting Lipid Panel:  2021    LDL Result:  66      At goal? yes, but has since stopped statin  Patient taking a statin? no discontinued atorvastatin due to   Kidney Function Exams (microalbumin and eGFR):  due  Blood Pressure Goal is <130/<80       Currently at goal?  no  Eye Exam date:  2021      Retinopathy present?  no, sees [facility] in [place]  Foot Exam date:  2021     Neuropathy present?  no  Taking daily aspirin?  yes  Tobacco use?  no  Immunizations up-to-date?  yes          Health Status   Allergies:    Allergic Reactions (All)  No Known Medication Allergies  Canceled/Inactive Reactions (All)  No known allergies   Medications:  (Selected)   Prescriptions  Prescribed  CPAP Machine and Supplies: CPAP Machine and Supplies, See Instructions, Instructions: Auto Titrating 4-20cm.  Heated humidifier, heated tubing, filters, full face mask of choice with headgear.  Replacement cushions and supplies as needed.   Months=99 (lifetime), Supply, # 1 EA,...  Trulicity Pen 3 mg/0.5 mL subcutaneous solution: ( 3 mg ), Subcutaneous, qweek, # 6 mL, 0 Refill(s), Type: Maintenance, Pharmacy: [id number], due for a visit for further refills, 3 mg Subcutaneous qweek, 67.5, in, 21 10:36:00 CST, Height Measured, 256.2, lb, 21 10:36:00 CST, W...  Uroxatral 10 mg oral tablet, extended release: = 1 tab(s) ( 10 mg ), Oral, daily, # 7 tab(s), 0 Refill(s), Type: Maintenance, Pharmacy:  [id number], 1 tab(s) Oral daily, 67.5, in, 03/04/21 10:36:00 CST, Height Measured, 256.2, lb, 03/04/21 10:36:00 CST, Weight Measured  accu check fast clix lancets: accu check fast clix lancets, See Instructions, Instructions: testing 2x/ day, Supply, # 100 EA, 1 Refill(s), Type: Maintenance, Pharmacy: [id number], testing 2x/ day  accu check smart view test strips: accu check smart view test strips, See Instructions, Instructions: testing 2x/ day, Supply, # 200 EA, 3 Refill(s), Type: Maintenance, Pharmacy: [id number], testing 2x/ day  atorvastatin 10 mg oral tablet: = 1 tab(s) ( 10 mg ), po, qhs, # 90 tab(s), 1 Refill(s), Type: Maintenance, Pharmacy: [facility], 1 tab(s) Oral qhs, 67.5, in, 07/28/20 12:57:00 CDT, Height Measured, 252, lb, 07/28/20 12:57:00 CDT, Weight Measured  losartan 50 mg oral tablet: = 1 tab(s) ( 50 mg ), Oral, daily, # 90 tab(s), 1 Refill(s), Type: Maintenance, Pharmacy: [facility], 1 tab(s) Oral daily, 67.5, in, 03/04/21 10:36:00 CST, Height Measured, 256.2, lb, 03/04/21 10:36:00 CST, Weight Measured   Problem list:    All Problems (Selected)  Astigmatism of both eyes with presbyopia / SNOMED CT 704602677 / Confirmed  BPH associated with nocturia / SNOMED CT 2692953336 / Confirmed  Diabetes mellitus, type II / SNOMED CT 222864431 / Confirmed  History of colon polyps / SNOMED CT 9912008189 / Confirmed  Hypertriglyceridemia / SNOMED CT 327137034 / Confirmed  Morbid obesity / SNOMED CT 624212075 / Probable  Nuclear sclerotic cataract of both eyes / SNOMED CT 802685592 / Confirmed  Obstructive sleep apnea syndrome, moderate / SNOMED CT 864998266 / Confirmed  HST 10/7/18 with AHI 28 and oximetry lamine 73%  Ptosis of eyelid, bilateral / SNOMED CT 00169315 / Confirmed      Histories   Past Medical History:    Active  Diabetes mellitus, type II (SNOMED CT 599019538): Onset on 2/12/2010 at 59 years.  Morbid obesity (SNOMED  CT 311554839)  Hypertriglyceridemia (SNOMED CT 610928572)  BPH associated with nocturia (SNOMED CT 7773594048)  Astigmatism of both eyes with presbyopia (SNOMED CT 865844712)  Nuclear sclerotic cataract of both eyes (SNOMED CT 852839283)  Ptosis of eyelid, bilateral (SNOMED CT 09753474)  Resolved  History of fracture of hand (SNOMED CT 0357913717): Onset in  at 42 years.  Resolved.  Comments:  2012 CDT 11:09 AM CDT - Filomena Gracia  Boxer's, right.   Family History:    Prostate carcinoma  Brother (Naveen)  Diabetes mellitus  Uncle (M)  Comments:  2012 11:03 AM CDT - Filomena Gracia  X 2 uncles  Breast cancer  Sister (Elo, )  CA - Cancer of colon  Father ()  CAD - Coronary artery disease  Mother ()     Procedure history:    Polysomnogram (771669319) on 10/29/2018 at 68 Years.  Comments:  2019 3:34 PM CST - Niki Cristobal CMA  AHI: 25.5  Diabetic retinal eye exam (1873098919) on 10/19/2017 at 67 Years.  Comments:  3/21/2018 6:16 PM CDT - Arpan Williamson CMA  No Diabetic retinopathy.  Myopia of both eyes with astigmatism and presbyopia  Colonoscopy (547356822) on 2015 at 64 Years.  Comments:  2019 1:05 PM CDT - Jennifer Brambila  Indication:  history of polyps.  Sedation:  Midazolam 2 mg, Fentanyl 100 mcg.  Impression:  Diverticulosis in sigmoid colon.  Exam otherwise normal.    Recommendation:  Repeat in 5 years.  Colonoscopy ([id number]) on 2008 at 58 Years.  Comments:  2019 1:07 PM CDT - Jennifer Brambila  Indication:  History of polyps.  Sedation:  Midazolam 2 mg, Fentanyl 100 mcg.  Impression:  Two 3 mm polyps in recto-sigmoid colon.  Colonoscopy (180953864) in  at 53 Years.  Tonsillectomy (918494218).  sebaceous cyst removal.  ganglion cyst removal.   Social History:        Electronic Cigarette/Vaping Assessment            Electronic Cigarette Use: Never.      Alcohol Assessment            Past      Tobacco Assessment            Former smoker, quit more  "than 30 days ago      Substance Abuse Assessment            Never      Employment and Education Assessment            Retired, Work/School description: [employer] (Part time).      Home and Environment Assessment            Marital status: .  Spouse/Partner name: Romina Arboleda.  Lives with Spouse.  1 children.      Nutrition and Health Assessment            Type of diet: Regular.      Exercise and Physical Activity Assessment            Exercise frequency: \"Activities at work 5x/week\".      Sexual Assessment            Sexually active: Yes.  Sexual orientation: Heterosexual.        Physical Examination   Vital Signs   9/16/2021 2:28 PM CDT Peripheral Pulse Rate 76 bpm    Pulse Site Radial artery    HR Method Manual    Systolic Blood Pressure 138 mmHg  HI    Diastolic Blood Pressure 92 mmHg  HI    Mean Arterial Pressure 107 mmHg    BP Site Right arm    BP Method Manual      Measurements from flowsheet : Measurements   9/16/2021 2:28 PM CDT    Weight Measured - Standard                248 lb     General:  Alert and oriented, No acute distress.    Eye:  Pupils are equal, round and reactive to light, Normal conjunctiva.    HENT:  Normocephalic, Oral mucosa is moist, No pharyngeal erythema.    Neck:  Supple, Non-tender, No lymphadenopathy.    Respiratory:  Lungs are clear to auscultation.    Cardiovascular:  Normal rate, Regular rhythm.       Impression and Plan   Diagnosis     Diabetes mellitus, type II (UTP67-YD E11.9).     Hypertriglyceridemia (LJX87-MF E78.1).     Hypertension goal BP (blood pressure) < 130/80 (BNJ76-MB I10).     Plan:  will check labs, BP bordeline elevated, need to monitor, goal BP is <130/<80, consider starting crestor for cholesterol.    Orders     Orders (Selected)   Outpatient Orders  Future (On Hold)  Basic Metabolic Panel* ([facility]): Specimen Type: Serum, *Est. Collection Date: 09/21/21 +/- 2 day(s), Future Order  Hemoglobin A1c* ([facility]): " Specimen Type: Blood, *Est. Collection Date: 09/21/21 +/- 2 day(s), Future Order  Lipid panel with reflex to direct ldl* ([facility]): Specimen Type: Serum, *Est. Collection Date: 09/21/21 +/- 2 day(s), Future Order  Microalbumin, random urine (w/creatinine)* ([facility]): Specimen Type: Urine, *Est. Collection Date: 09/21/21 +/- 2 day(s), Future Order.     Medications refilled. Monitor BP and follow. Recheck BP with lab visit.     Diagnosis     BPH associated with nocturia (LEJ75-LD N40.1).     Course:  discussed medicaiton, notes some dryness but does not want to try alternate medicaiton, has previously tried Flomax. Continue current regimen. PSA up to date.

## 2022-02-15 NOTE — PROGRESS NOTES
Chief Complaint    sleep study results  History of Present Illness      Patient here for follow-up of a home sleep study.  He has chronic daytime sleepiness, snoring.  No witnessed apneas or leg symptoms.  No sleepwalking.  No insomnia.  He works 4:30 to 8:30.  He eats dinner on returning home.  Typically will fall asleep for a couple of hours on the couch.  He then sleeps from approximately 1 until 6:30 or 7.  Never feels rested.  He is on Trulicity for his diabetes which is controlled.  Review of Systems      He has nocturia as well as daytime frequency and urgency.  He wonders if anything can be done about this.  No heartburn or headache.  No chest pain, dyspnea, edema.  Physical Exam   Vitals & Measurements    HR: 67(Peripheral)  BP: 136/72     HT: 67.5 in  WT: 250 lb  BMI: 38.57       Patient appears comfortable and in no distress.  Alert and oriented.  HEENT exam reveals airway crowding but is Mallampati 1.  Neck is thick no adenopathy or thyromegaly.  Chest clear.  Heart exam regular no murmur or edema.  Cranial nerves normal.  Prostate is symmetric and 3+ without nodules.  Assessment/Plan       BPH associated with nocturia (N40.1)         Will obtain a PSA after discussion with the patient.  Discussed the risks and benefits of tamsulosin and he would like to proceed with treatment.         Orders:          PSA, Total (Room)* ([facility]), Specimen Type: Serum, Collection Date: 11/13/18 10:59:00 CST                Diabetes mellitus, type II (E11.9)         Controlled                Morbid obesity (E66.01)         Discussed the importance of weight loss due to his diabetes and obstructive sleep apnea.                Obstructive sleep apnea syndrome, moderate (G47.33)        Moderate, symptomatic instructed sleep apnea.  Discussed all the treatment modalities.  Discussed in lab CPAP titration versus at home, and he prefers the at home option.                  Orders:         Miscellaneous Rx Supply, AutoPAP +4-20cm  H2o, See Instructions, Instructions: Heated humidifier, heated tubing, filters, nasal or full face mask of choice with headgear. Replacement cushions and supplies as needed. Months = 99 (Lifetime), Supply, # 1 EA, 0 Refill(s), Type:..., (Ordered)         tamsulosin, = 1 cap(s) ( 0.4 mg ), PO, Daily, # 90 cap(s), 3 Refill(s), Type: Maintenance, Pharmacy: [id number], 1 cap(s) Oral daily, (Ordered)         Return to Clinic (Request), RFV: F/U AutoPAP, Return in 6-8 weeks, Instructions: Download before visit  Patient Information     Name:DAVID SULLIVAN      Address:      [address] [place] [address] [place] [address]     Sex:Male     YOB: 1950     [phone]     Emergency Contact:BRAYDEN SULLIVAN     MRN:[id number]     FIN:[id number]     Location:[facility]     Date of Service:11/13/2018      Primary Care Physician:       Victor M Gibbs MD, [phone]      Attending Physician:       Emmett Langford MD, [phone]  Problem List/Past Medical History    Ongoing     BPH associated with nocturia     Diabetes mellitus, type II     History of chicken pox     History of colon polyps     Hypertriglyceridemia     Morbid obesity     Obstructive sleep apnea syndrome, moderate       Comments: HST 10/7/18 with AHI 28 and oximetry lamine 73%    Historical     History of fracture of hand       Comments: Boxer's, right.  Procedure/Surgical History     Diabetic retinal eye exam (10/19/2017)      Comments:      No Diabetic retinopathy.  Myopia of both eyes with astigmatism and presbyopia     Colonoscopy (2009)     Colonoscopy (2003)     ganglion cyst removal     sebaceous cyst removal     Tonsillectomy  Medications        aspirin 81 mg oral tablet: 81 mg, 1 tab(s), po, daily, 0 Refill(s).        accu check smart view test strips: See Instructions, testing 2x/ day, 200 EA, 3 Refill(s).        accu check fast clix lancets: See Instructions, testing 2x/ day, 100 EA, 1 Refill(s).       "  atorvastatin 10 mg oral tablet: 10 mg, 1 tab(s), po, qhs, 90 tab(s), 1 Refill(s).        Trulicity Pen 1.5 mg/0.5 mL subcutaneous solution: 1.5 mg, Subcutaneous, qweek, INJECT ONE PEN SUBCUTANEOUSLY ONCE WEEKLY AS DIRECTED, 2 mL, 6 Refill(s).  Allergies    No Known Medication Allergies  Social History    Smoking Status - 11/13/2018     Former smoker     Alcohol      Past, 04/18/2017     Employment and Education      Retired, Work/School description: [employer] (Part time)., 04/18/2017     Exercise and Physical Activity      Exercise frequency: \"Activities at work 5x/week\"., 04/18/2017     Home and Environment      Marital status: . Spouse/Partner name: Romina Arboleda. Lives with Spouse. 1 children., 04/18/2017     Nutrition and Health      Type of diet: Regular., 03/12/2013     Sexual      Sexually active: Yes. Sexual orientation: Heterosexual., 03/12/2013     Substance Abuse      Never, 04/18/2017     Tobacco      Former smoker, 04/18/2017  Family History    Breast cancer: Sister.    CA - Cancer of colon: Father.    CAD - Coronary artery disease: Mother.    Diabetes mellitus: Uncle (M).    Prostate carcinoma: Brother.  Immunizations      Vaccine Date Status      pneumococcal (PPSV23) 06/20/2017 Given      tetanus/diphth/pertuss (Tdap) adult/adol 09/27/2012 Given      Td 04/13/2003 Recorded  Lab Results          Lab Results (Last 4 results within 90 days)          Hgb A1c: 6.9 High (10/02/18 10:31:00)  Diagnostic Results   Home sleep test on October 7, 2018 revealed an AHI of 28 and an oximetry lamine of 73%  "

## 2022-02-15 NOTE — PROGRESS NOTES
Patient:   DAVID SULLIVAN            MRN: [id number]            FIN: [id number]               Age:   68 years     Sex:  Male     :  1950   Associated Diagnoses:   None   Author:   Elva Strong      Doctor: Bernie  Patient Information  (Medicare and address information is on file)  Medicare HICN#: _  Address:_ City:_ State:_ Zip:_  Home Phone:_ Work Phone:_ Other Contact Phone:_    Diabetes self-management training (DSMT) and medical nutrition therapy (MNT) are individual and complementary services to improve diabetes care. For Medicare beneficiaries, both services can be ordered in the same year. Research indicates MNT combined with DSMT improves outcomes.    Diabetes Self-Managment Training (DSMT)  Medicare: 10 hours initial DSMT in 12 month period, plus 2 hours follow-up annually  *Check type of training services and number of hours requested:  _ Initial DSMT:  10 hours or _ no. hrs. requested  X Follow-up DSMT: X 2 hours or _ no. hrs. requested  _ Additional insulin training: _ no. hrs. requested    *Patients with special needs requiring individual DSMT  Check all special needs that apply:  _ Vision _ Hearing  _ Physical  _Cognitive Impairment  _ Language limitations X Other  No classes offered    *DSMT Content  X All ten content areas, as appropriate  _ Monitoring diabetes    _ Diabetes as disease process  _ Psychological adjustment _ Physical activity  _ Nutritional management  _ Goal setting, problem solving  _ Medications       _ Prevent, detect and treat acute complications  _ Preconception/pregnancy _ Prevent, detect and teat chronic complications     management or gestational     diabetes management    Medical Nutrition Therapy (MNT)  Medicare: 3 hours initial MNT in the first calendar year, plus two hours follow-up MNT annually. Additional MNT hours available for change in medical condition, treament and/or diagnosis.  *Check the type of MNT and/or number of additional hours requested:  _ Initial  MNT:   X Annual follow-up MNT  _ Additional MNT services in the same calendar year, per RD recommendations  _ number of additional hours requested    Please specify change in medical condition, treatment and/or diagnosis      *Diagnosis  Please send recent labs for patient eligibility & outcomes monitoring  _ Type 1 uncontrolled  _ Type 1 controlled  _ Type 2 uncontrolled  X Type 2 controlled  _ Gestational diabetes _ Other _    Complications/Comorbidities  Check all that apply:  _ Hypertension   X Dyslipidemia _ Stroke      _ Nephropathy  _ Neuropathy    _ Retinopathy  _ Renal disease   _ CHD  _ PVD       _ Pregnancy  _ Non-healing wound X Obesity    _ Mental/affective disorder     _ Other _    Current Diabetes Medications  Specify type, dose and frequency  Oral: _  Insulin:   Trulicity 1.5mg weekly   Patient now uses: _ Pen _ Needle _ Pump    Patient Behavior Goals/Plan of Care    To gradually lose weight and improve blood sugar control.  Minimize the risk for hypoglycemia and reduce risks of developing complications related to uncontrolled diabetes.    Signature and UPIN #: (UPIN and NPI are on file)  Group/Practice name, address and phone: [facility], [address] [place] [address]  [phone]

## 2022-02-15 NOTE — TELEPHONE ENCOUNTER
Entered by Pauline Soto CMA on August 27, 2021 2:36:19 PM CDT  ---------------------  From: Pauline Soto CMA   To: [place] [id number]    Sent: 8/27/2021 2:36:19 PM CDT  Subject: Medication Management     ** Submitted: **  Order:dulaglutide (Trulicity Pen 3 mg/0.5 mL subcutaneous solution)  3 mg  Subcutaneous  qweek  Qty:  6 mL        Refills:  0          Substitutions Allowed     Route To [place] [address]    Signed by Pauline Soto CMA  8/27/2021 7:35:00 PM [facility]    ** Submitted: **  Complete:dulaglutide (Trulicity Pen 3 mg/0.5 mL subcutaneous solution)   Signed by Pauline Soto CMA  8/27/2021 7:36:00 PM [facility]    ** Not Approved:  **  dulaglutide (Trulicity 3 MG/0.5ML Subcutaneous Solution Pen-injector)  INJECT 3 MG SUBCUTANEOUSLY ONCE PER WEEK  Qty:  12 mL        Days Supply:  84        Refills:  0          Substitutions Allowed     Route To [place] [address]   Signed by Pauline Soto CMA            ------------------------------------------  From: [place] [id number]  To: Victor M Gibbs MD  Sent: August 27, 2021 12:42:04 PM CDT  Subject: Medication Management  Due: August 20, 2021 11:16:59 AM CDT     ** On Hold Pending Signature **     Dispensed Drug: dulaglutide (Trulicity Pen 3 mg/0.5 mL subcutaneous solution), INJECT 3 MG SUBCUTANEOUSLY ONCE PER WEEK  Quantity: 12 mL  Days Supply: 84  Refills: 0  Substitutions Allowed  Notes from Pharmacy:  ------------------------------------------LR 3/16 for # 6mL 1 and refill. Last visit for DM check on 3/4. Due in Sept (in recall). Refilled x 1 mo per protocol.   Subjective:       Patient ID: Lauro Grajeda is a 72 y.o. male.    Chief Complaint:  Other ([facility] care, Dr Hays pt.)      73 yo man presents for new patient evaluation of asthma. He has been following with Dr Hays for asthma for few years. He is on Symbicort was supposed to be 2 puffs BID but doing well so just once per day. 7/31 he had URI treated with antibiotics. Last week he saw Dr Hays and was ok. Then Thursday he had come chest congestion and tightness. Progressively got worse and during night Thursday night/Friday morning was very tight, very SOB, cough and wheeze. Felt like not moving air at all. He did rescue inhaler and not much help. Took loratadine and not much help. Took Symbicort and rescue inhaler together and that helped. He refused to go to ER. saw NP at PCP office at 7am and given medrol pack which has also helped. He is on Symbicort BID now. He has cough syrup as well. This is uncommon for him. he as a nebulizer but never used before. Not much rhinitis. He had blood test and positive to DM, has covers in pillows but not mattress. He has no eczema. No known food, insect or latex allergy. He did also have immune workup with Dr Hays with normal immunoglobulins and protected to 9 of 14 strep serotypes but Prevnar was in 2014 and pneumovax in 2016.      Environmental History: see history section for home environment  Review of Systems   Constitutional: Negative for activity change, appetite change, chills, fatigue, fever and unexpected weight change.   HENT: Negative for congestion, ear discharge, ear pain, facial swelling, hearing loss, mouth sores, nosebleeds, postnasal drip, rhinorrhea, sinus pressure, sneezing, sore throat, tinnitus, trouble swallowing and voice change.    Eyes: Negative for discharge, redness, itching and visual disturbance.   Respiratory: Positive for cough, chest tightness, shortness of breath and wheezing.    Cardiovascular: Negative for chest pain, palpitations and leg  swelling.   Gastrointestinal: Negative for abdominal distention, abdominal pain, constipation, diarrhea, nausea and vomiting.   Genitourinary: Negative for difficulty urinating.   Musculoskeletal: Negative for arthralgias, back pain, joint swelling and myalgias.   Skin: Negative for color change, pallor and rash.   Neurological: Negative for dizziness, tremors, speech difficulty, weakness, light-headedness and headaches.   Hematological: Negative for adenopathy. Does not bruise/bleed easily.   Psychiatric/Behavioral: Negative for agitation, confusion, decreased concentration and sleep disturbance. The patient is not nervous/anxious.         Objective:      Physical Exam   Constitutional: He is oriented to person, place, and time. He appears well-developed and well-nourished. No distress.   HENT:   Head: Normocephalic and atraumatic.   Right Ear: Hearing, tympanic membrane, external ear and ear canal normal.   Left Ear: Hearing, tympanic membrane, external ear and ear canal normal.   Nose: No mucosal edema (pink turbinates), rhinorrhea, sinus tenderness or septal deviation. No epistaxis.   Mouth/Throat: Oropharynx is clear and moist and mucous membranes are normal. No uvula swelling.   Eyes: Conjunctivae are normal. Right eye exhibits no discharge. Left eye exhibits no discharge.   Neck: Normal range of motion. No thyromegaly present.   Cardiovascular: Normal rate, regular rhythm and normal heart sounds.   No murmur heard.  Pulmonary/Chest: Effort normal and breath sounds normal. No respiratory distress. He has no wheezes.   Abdominal: Soft. He exhibits no distension. There is no tenderness.   Musculoskeletal: Normal range of motion. He exhibits no edema.   Lymphadenopathy:     He has no cervical adenopathy.   Neurological: He is alert and oriented to person, place, and time. Coordination normal.   Skin: Skin is warm and dry. No rash noted. No erythema.   Psychiatric: He has a normal mood and affect. His behavior is  normal. Judgment and thought content normal.   Nursing note and vitals reviewed.      Laboratory:   none performed   Assessment:       1. Moderate persistent asthma without complication    2. Chronic allergic rhinitis         Plan:       1. Continue Symbicort 160 2 puff BID, if doing well for awhile then can cosnider decreasing  2. albuteorl neb or inhaler every 4 hours as needed  3. Has appointment with pulmonary so will await their full PFT  4. Pneumovax today and repeat labs 5 weeks  5. Phone review

## 2022-02-15 NOTE — NURSING NOTE
Diabetes Eye Testing Entered On:  7/15/2020 10:33 AM CDT    Performed On:  6/29/2020 10:33 AM CDT by Marisa Aguilar MA               Diabetes Eye Testing   Retinopathy Present TR :   No   Dilated Retinal Exam Date TR :   6/29/2020 CDT   Marisa Aguilar MA - 7/15/2020 10:33 AM CDT

## 2022-02-15 NOTE — PROGRESS NOTES
Patient:   DAVID SULLIVAN            MRN: [id number]            FIN: [id number]               Age:   68 years     Sex:  Male     :  1950   Associated Diagnoses:   Diabetes mellitus, type II; History of colon polyps; Hypertriglyceridemia; Morbid Obesity; Obstructive sleep apnea syndrome, moderate   Author:   Victor M Gibbs MD      Visit Information      Date of Service: 2019 09:52 am  Performing Location: [facility]  Encounter#: [id number]      Primary Care Provider (PCP):  Victor M Gibbs MD    NPI# [id number]      Chief Complaint   2019 9:58 AM CDT    DM check        History of Present Illness   Here for fu  Retiring soon  Now on Medicare  Trulicity will cost too much  intol metformin  bph contolled  now on cpap  Statin as directed      Review of Systems   Constitutional:  Negative.    Eye:  Negative.    Ear/Nose/Mouth/Throat:  Negative.    Respiratory:  Negative.    Cardiovascular:  Negative.    Gastrointestinal:  Negative.    Genitourinary:  Negative.    Hematology/Lymphatics:  Negative.    Endocrine:  Negative.    Immunologic:  Negative.    Musculoskeletal:  Negative.    Integumentary:  Negative.    Neurologic:  Negative.       Health Status   Allergies:    Allergic Reactions (Selected)  No Known Medication Allergies   Medications:  (Selected)   Prescriptions  Prescribed  Trulicity Pen 1.5 mg/0.5 mL subcutaneous solution: ( 1.5 mg ), Subcutaneous, qweek, Instructions: INJECT ONE PEN SUBCUTANEOUSLY ONCE WEEKLY AS DIRECTED, # 2 mL, 6 Refill(s), Type: Maintenance, Pharmacy: [id number]  accu check fast clix lancets: accu check fast clix lancets, See Instructions, Instructions: testing 2x/ day, Supply, # 100 EA, 1 Refill(s), Type: Maintenance, Pharmacy: [id number], testing 2x/ day  accu check smart view test strips: accu check smart view test strips, See Instructions, Instructions: testing 2x/ day, Supply, # 200 EA, 3 Refill(s), Type: Maintenance,  Pharmacy: [facility] [id number], testing 2x/ day  atorvastatin 10 mg oral tablet: = 1 tab(s) ( 10 mg ), po, qhs, # 90 tab(s), 1 Refill(s), Type: Maintenance, Pharmacy: [facility] [id number], 1 tab(s) Oral qhs  tamsulosin 0.4 mg oral capsule: = 1 cap(s) ( 0.4 mg ), PO, Daily, # 90 cap(s), 3 Refill(s), Type: Maintenance, Pharmacy: [facility] [id number], 1 cap(s) Oral daily,    Medications          *denotes recorded medication          accu check fast clix lancets: See Instructions, testing 2x/ day, 100 EA, 1 Refill(s).          accu check smart view test strips: See Instructions, testing 2x/ day, 200 EA, 3 Refill(s).          atorvastatin 10 mg oral tablet: 10 mg, 1 tab(s), po, qhs, 90 tab(s), 1 Refill(s).          Trulicity Pen 1.5 mg/0.5 mL subcutaneous solution: 1.5 mg, Subcutaneous, qweek, INJECT ONE PEN SUBCUTANEOUSLY ONCE WEEKLY AS DIRECTED, 2 mL, 6 Refill(s).          tamsulosin 0.4 mg oral capsule: 0.4 mg, 1 cap(s), PO, Daily, 90 cap(s), 3 Refill(s).     Problem list:    All Problems  BPH associated with nocturia / SNOMED CT 3785093384 / Confirmed  History of chicken pox / SNOMED CT 042337057 / Confirmed  History of colon polyps / SNOMED CT 5435978650 / Confirmed  Hypertriglyceridemia / SNOMED CT 639122898 / Confirmed  Morbid obesity / SNOMED CT 986369998 / Probable  Obstructive sleep apnea syndrome, moderate / SNOMED CT 258800158 / Confirmed  Diabetes mellitus, type II / SNOMED CT 391061880 / Confirmed  Resolved: History of fracture of hand / SNOMED CT 2169841493  Canceled: Colon Polyps / ICD-9-.3      Histories   Past Medical History:    Active  Diabetes mellitus, type II (297345903): Onset on 2/12/2010 at 59 years.  History of colon polyps (2351847018)  Morbid obesity (690258069)  Hypertriglyceridemia (754194860)  History of chicken pox (518981953)  Resolved  History of fracture of hand (7379424153): Onset in 1992 at 42 years.  Resolved.  Comments:  9/26/2012 CDT 11:09 AM GRECIA - Basil  "Susan Boxer's, right.   Family History:    Prostate carcinoma  Brother (Naveen)  Diabetes mellitus  Uncle (M)  Comments:  2012 11:03 AM CDT - Filomena Gracia  X 2 uncles  Breast cancer  Sister (Pat, )  CA - Cancer of colon  Father ()  CAD - Coronary artery disease  Mother ()     Procedure history:    Polysomnogram (SNOMED CT 619219285) on 10/29/2018 at 68 Years.  Comments:  2019 3:34 PM CST - Niki Cristobal CMA  AHI: 25.5  Diabetic retinal eye exam (SNOMED CT 5808490840) on 10/19/2017 at 67 Years.  Comments:  3/21/2018 6:16 PM CDT - Arpan Williamson CMA  No Diabetic retinopathy.  Myopia of both eyes with astigmatism and presbyopia  Colonoscopy (SNOMED CT 193883525) on 2015 at 64 Years.  Colonoscopy (SNOMED CT 878720000) on 2008 at 58 Years.  Colonoscopy (SNOMED CT 724302800) in  at 53 Years.  Tonsillectomy (SNOMED CT 676222032).  sebaceous cyst removal.  ganglion cyst removal.   Social History:        Alcohol Assessment            Past      Tobacco Assessment            Former smoker      Substance Abuse Assessment            Never      Employment and Education Assessment            Retired, Work/School description: [employer] - [employer] (Part time).      Home and Environment Assessment            Marital status: .  Spouse/Partner name: Romina Arboleda.  Lives with Spouse.  1 children.      Nutrition and Health Assessment            Type of diet: Regular.      Exercise and Physical Activity Assessment            Exercise frequency: \"Activities at work 5x/week\".      Sexual Assessment            Sexually active: Yes.  Sexual orientation: Heterosexual.      Physical Examination   Vital Signs   2019 9:58 AM CDT Temperature Tympanic 97.4 DegF  LOW    Peripheral Pulse Rate 69 bpm    HR Method Electronic    Systolic Blood Pressure 126 mmHg    Diastolic Blood Pressure 77 mmHg    Mean Arterial Pressure 93 mmHg    BP Method Electronic "      Measurements from flowsheet : Measurements   5/22/2019 9:58 AM CDT Height Measured - Standard 67.5 in    Weight Measured - Standard 250.4 lb    BSA 2.32 m2    Body Mass Index 38.64 kg/m2  HI      General:  Alert and oriented, No acute distress.    Eye:  Pupils are equal, round and reactive to light, Extraocular movements are intact.    HENT:  Normocephalic, Oral mucosa is moist.    Neck:  Supple, Non-tender, No carotid bruit, No jugular venous distention, No lymphadenopathy, No thyromegaly.    Respiratory:  Lungs are clear to auscultation, Respirations are non-labored, Breath sounds are equal.    Cardiovascular:  Normal rate, Regular rhythm, No murmur, Good pulses equal in all extremities, No edema.    Gastrointestinal:  Soft, Non-tender, Non-distended, Normal bowel sounds, No organomegaly.    Musculoskeletal:  No tenderness, No swelling, No deformity.    Integumentary:  Warm, Dry.    Neurologic:  Alert, Oriented, No focal deficits, Cranial Nerves II-XII are grossly intact.    Psychiatric:  Cooperative, Appropriate mood & affect, Normal judgment.       Health Maintenance      Recommendations     Pending (in the next year)        OverDue           Exercise due  03/12/14  and every 1  year(s)           Preventative Services due  09/27/17  and every 5  year(s)           Alcohol Misuse Screen (Male) due  03/22/19  and every 1  year(s)           Depression Screen (Male) due  03/22/19  and every 1  year(s)           DM - Foot Exam due  03/22/19  and every 1  year(s)        Due            Abdominal Aortic Aneurysm Screen (if hx of smoking) due  05/22/19  One-time only           Fall Risk Screen (Male) due  05/22/19  and every 1  year(s)           Hepatitis C Screen 5499-9133 (Male) due  05/22/19  One-time only           Lung Cancer Screen (Male) due  05/22/19  and every 1  year(s)        Refused            Zoster/Shingles Vaccine due  05/22/19  One-time only           Influenza Vaccine due  09/01/19  and every 1   year(s)        Due In Future            DM - HgbA1c not due until  08/13/19  and every 3  month(s)           DM - Eye Exam not due until  02/26/20  and every 1  year(s)           DM - Microalbumin not due until  05/13/20  and every 1  year(s)           Lipid Disorders Screen (Male) not due until  05/13/20  and every 1  year(s)     Satisfied (in the past 1 year)        Satisfied            Body Mass Index Check (Male) on  05/22/19.           Body Mass Index Check (Male) on  11/13/18.           DM - Eye Exam on  02/26/19.           DM - HgbA1c on  05/13/19.           DM - HgbA1c on  10/02/18.           DM - Microalbumin on  05/13/19.           DM - Microalbumin on  05/13/19.           High Blood Pressure Screen (Male) on  05/22/19.           High Blood Pressure Screen (Male) on  11/13/18.           High Blood Pressure Screen (Male) on  10/09/18.           Lipid Disorders Screen (Male) on  05/13/19.           Lipid Disorders Screen (Male) on  05/13/19.           Lipid Disorders Screen (Male) on  05/13/19.           Lipid Disorders Screen (Male) on  05/13/19.           Tobacco Use Screen (Male) on  05/22/19.           Tobacco Use Screen (Male) on  11/13/18.           Tobacco Use Screen (Male) on  10/09/18.        Review / Management   Results review:  Lab results   5/13/2019 8:02 AM CDT Sodium Level 140 mmol/L    Potassium Level 4.2 mmol/L    Chloride Level 104 mmol/L    CO2 Level 27 mmol/L    Glucose Level 140 mg/dL  HI    BUN 21 mg/dL    Creatinine Level 0.91 mg/dL    BUN/Creat Ratio NOT APPLICABLE    eGFR 88 mL/min/1.73m2    eGFR African American 101 mL/min/1.73m2    Calcium Level 9.6 mg/dL    Hgb A1c 7.2  HI    Cholesterol 153 mg/dL    Non-HDL Cholesterol 117    HDL 36 mg/dL  LOW    Cholesterol/HDL Ratio 4.3    LDL 80    Triglyceride 280 mg/dL  HI    U Microalbumin 0.3 mg/dL    Ur Creatinine 103 mg/dL    Ur Microalbumin/Creatinine Ratio 3   .       Impression and Plan   Diagnosis     Diabetes mellitus, type II  (XXZ66-PS E11.9).     History of colon polyps (CDU78-OC Z86.010).     Hypertriglyceridemia (SWS15-JY E78.1).     Morbid Obesity (FSH72-NV E66.01).     Obstructive sleep apnea syndrome, moderate (TQE93-EQ G47.33).     Course:  Not well controlled.    Plan:  diab ed for meds and consider vaughn  6 mo fu  BMI,Weight loss,exercise,diet discussed  , colonoscopy next visit.    Patient Instructions:       Counseled: Patient, Regarding diagnosis, Regarding treatment, Regarding medications, Diet, Activity.

## 2022-02-15 NOTE — TELEPHONE ENCOUNTER
---------------------  From: Neema Orellana LPN   Sent: 10/29/2021 9:45:55 AM CDT  Subject: covid results     Notified patient that covid results are negative. Return to work/school if symptoms are improving and no fever for 24 hours.

## 2022-02-15 NOTE — TELEPHONE ENCOUNTER
---------------------  From: Tracey Clancy CMA   Sent: 10/27/2021 4:07:37 PM CDT  Subject: [facility] Testing     Pt was seen at [facility] for covid testing per Eldon Gallegos. O2 Sat was 97%. Pt resides in [facility]-will notify Public Health if positive.

## 2022-02-15 NOTE — CARE COORDINATION
Pt appears on  TFS chronic disease panel as out of parameters for elevated BP  Placed RTC for CSS only BP check.  Ilia Haque CMA.

## 2022-02-15 NOTE — PROGRESS NOTES
Patient:   DAVID SULLIVAN            MRN: [id number]            FIN: [id number]               Age:   71 years     Sex:  Male     :  1950   Associated Diagnoses:   Close exposure to 2019 novel coronavirus   Author:   Eldon Gallegos PA-C      Visit Information      Date of Service: 10/27/2021 06:32 am  Performing Location: [facility]  Encounter#: [id number]      Primary Care Provider (PCP):  Jermaine Ramírez MD    NPI# [id number]      Referring Provider:  Eldon Gallegos PA-C    NPI# [id number]   Visit type:  Telephone Encounter.    Source of history:  Patient.    Location of patient:  Home  Call Start Time:   905  Call End Time:    910      Chief Complaint   C-19 exposure      History of Present Illness   Today's visit was conducted via telephone due to the COVID-19 pandemic. Patient's consent to telephone visit was obtained and documented.      Reason for visit:  C-19 symptoms. Nasal congestion and sore throat. No other symptoms. Has not had the vaccine.      Review of Systems   Constitutional:  Negative.    Ear/Nose/Mouth/Throat:  Negative except as documented in history of present illness.    Respiratory:  Negative.       Impression and Plan   Diagnosis     Close exposure to 2019 novel coronavirus (IRR88-LC Z20.822).     Plan:  Patient should remain isolated until results of test return and given that tests are not 100% accurate, would be safest to assume that they are contagious with COVID-19 until their symptoms have fully resolved. Isolation is recommended for at least 7 days from the onset of symptoms and for 3 days after resolution of fevers and productive cough. This means patient should not go to work or any public areas. In addition, it is recommended at home that they separate themselves from other people and from animals as much as possible, including using a separate bathroom. If they do need to be around others, a facemask is recommended. Frequent hand hygiene and cleaning of high  touch surfaces is also recommended.   Symptoms can last for several weeks. For patients with COVID-19, they can sometimes start to improve and then get worse again. If symptoms worsen at any time, including significant shortness of breath, low oxygen levels, high fevers that cannot be controlled, or concerns for dehydration, they should seek medical care. If going to the ER, calling 911, or seeking care at the clinic, they are reminded to notify staff that they have been tested for COVID-19.  Patient also is informed that testing will be done in their car at a scheduled time. Test will be sent to an outside commercial lab and billed by that lab. [employer] cannot confirm to patient how billing will be handled by their insurance company.    Patient is also informed that testing for COVID-19 must be reported to the public health department along with contact information for the patient.  .    Orders     Orders   Charges (Evaluation and Management):  89814 phone E/M by phys 5-10 min (Charge) (Order): Quantity: 1, Close exposure to 2019 novel coronavirus  Requests (Lab):  SARS-CoV-2 RNA (COVID-19), Qualitative NAAT (Request) (Order): Close exposure to 2019 novel coronavirus.        Health Status   Allergies:    Allergic Reactions (Selected)  No Known Medication Allergies   Medications:  (Selected)   Prescriptions  Prescribed  CPAP Machine and Supplies: CPAP Machine and Supplies, See Instructions, Instructions: Auto Titrating 4-20cm.  Heated humidifier, heated tubing, filters, full face mask of choice with headgear.  Replacement cushions and supplies as needed.   Months=99 (lifetime), Supply, # 1 EA,...  Trulicity Pen 1.5 mg/0.5 mL subcutaneous solution: ( 1.5 mg ), Subcutaneous, qweek, # 6 mL, 1 Refill(s), Type: Maintenance, Pharmacy: [id number], 1.5 mg Subcutaneous qweek, 67.5, in, 03/04/21 10:36:00 CST, Height Measured, 248, lb, 09/16/21 14:28:00 CDT, Weight Measured  Uroxatral 10 mg oral tablet,  extended release: = 1 tab(s) ( 10 mg ), Oral, daily, # 90 tab(s), 1 Refill(s), Type: Maintenance, Pharmacy: [id number], 1 tab(s) Oral daily, 67.5, in, 03/04/21 10:36:00 CST, Height Measured, 248, lb, 09/16/21 14:28:00 CDT, Weight Measured  accu check fast clix lancets: accu check fast clix lancets, See Instructions, Instructions: testing 2x/ day, Supply, # 100 EA, 1 Refill(s), Type: Maintenance, Pharmacy: [facility] [id number], testing 2x/ day  accu check smart view test strips: accu check smart view test strips, See Instructions, Instructions: testing 2x/ day, Supply, # 200 EA, 3 Refill(s), Type: Maintenance, Pharmacy: [facility] [id number], testing 2x/ day  losartan 50 mg oral tablet: = 1 tab(s) ( 50 mg ), Oral, daily, # 90 tab(s), 1 Refill(s), Type: Maintenance, Pharmacy: [id number], 1 tab(s) Oral daily, 67.5, in, 03/04/21 10:36:00 CST, Height Measured, 248, lb, 09/16/21 14:28:00 CDT, Weight Measured   Problem list:    All Problems  Ptosis of eyelid, bilateral / SNOMED CT 32501420 / Confirmed  Obstructive sleep apnea syndrome, moderate / SNOMED CT 976158823 / Confirmed  Nuclear sclerotic cataract of both eyes / SNOMED CT 046091274 / Confirmed  Morbid obesity / SNOMED CT 360048730 / Probable  Hypertriglyceridemia / SNOMED CT 926491471 / Confirmed  History of colon polyps / SNOMED CT 8119691433 / Confirmed  Diabetes mellitus, type II / SNOMED CT 195115531 / Confirmed  BPH associated with nocturia / SNOMED CT 0066664543 / Confirmed  Astigmatism of both eyes with presbyopia / SNOMED CT 797731565 / Confirmed  Inactive: History of chicken pox / SNOMED CT 466041013  Resolved: History of fracture of hand / SNOMED CT 6119385619  Canceled: Colon Polyps / ICD-9-.3      Histories   Past Medical History:    Active  Diabetes mellitus, type II (896295172): Onset on 2/12/2010 at 59 years.  Morbid obesity (327328484)  Hypertriglyceridemia (706917062)  BPH associated with nocturia  (5621313567)  Astigmatism of both eyes with presbyopia (276022613)  Nuclear sclerotic cataract of both eyes (630061042)  Ptosis of eyelid, bilateral (56003243)  Resolved  History of fracture of hand (5138202903): Onset in  at 42 years.  Resolved.  Comments:  2012 CDT 11:09 AM CDT - Filomena Gracia  Boxer's, right.   Family History:    Prostate carcinoma  Brother (Naveen)  Diabetes mellitus  Uncle (M)  Comments:  2012 11:03 AM CDT - Filomena Gracia  X 2 uncles  Breast cancer  Sister (Elo, )  CA - Cancer of colon  Father ()  CAD - Coronary artery disease  Mother ()     Procedure history:    Polysomnogram (304418343) on 10/29/2018 at 68 Years.  Comments:  2019 3:34 PM CST - Niki Cristobal CMA  AHI: 25.5  Diabetic retinal eye exam (8156395228) on 10/19/2017 at 67 Years.  Comments:  3/21/2018 6:16 PM CDT - Arpan Williamson CMA  No Diabetic retinopathy.  Myopia of both eyes with astigmatism and presbyopia  Colonoscopy (931744360) on 2015 at 64 Years.  Comments:  2019 1:05 PM CDT - Jennifer Brambila  Indication:  history of polyps.  Sedation:  Midazolam 2 mg, Fentanyl 100 mcg.  Impression:  Diverticulosis in sigmoid colon.  Exam otherwise normal.    Recommendation:  Repeat in 5 years.  Colonoscopy (671430027) on 2008 at 58 Years.  Comments:  2019 1:07 PM CDT - Jennifer Brambila  Indication:  History of polyps.  Sedation:  Midazolam 2 mg, Fentanyl 100 mcg.  Impression:  Two 3 mm polyps in recto-sigmoid colon.  Colonoscopy (948439308) in  at 53 Years.  Tonsillectomy (819195734).  sebaceous cyst removal.  ganglion cyst removal.   Social History:        Electronic Cigarette/Vaping Assessment            Electronic Cigarette Use: Never.      Alcohol Assessment            Past      Tobacco Assessment            Former smoker, quit more than 30 days ago      Substance Abuse Assessment            Never      Employment and Education Assessment            Retired, Work/School  "description: [employer] - [employer] Services (Part time).      Home and Environment Assessment            Marital status: .  Spouse/Partner name: Romina Arboleda.  Lives with Spouse.  1 children.      Nutrition and Health Assessment            Type of diet: Regular.      Exercise and Physical Activity Assessment            Exercise frequency: \"Activities at work 5x/week\".      Sexual Assessment            Sexually active: Yes.  Sexual orientation: Heterosexual.        Health Maintenance      Recommendations     Pending (in the next year)        OverDue           Exercise due  03/12/14  and every 1  year(s)           Preventative Services due  09/27/17  and every 5  year(s)           Alcohol Misuse Screen due  03/22/19  and every 1  year(s)           Depression Screen due  03/22/19  and every 1  year(s)           DM - Foot Exam due  03/22/19  and every 1  year(s)           DM - Eye Exam due  06/29/21  and every 1  year(s)           Influenza Vaccine due  09/01/21  and every 1  year(s)        Due            Abdominal Aortic Aneurysm Screen (if hx of smoking) due  10/27/21  One-time only           Fall Risk Screen due  10/27/21  and every 1  year(s)           Hepatitis C Screen 9456-6795 due  10/27/21  One-time only           Lung Cancer Screen due  10/27/21  and every 1  year(s)        Near Due            DM - HgbA1c near due  12/24/21  and every 3  month(s)        Due In Future            Body Mass Index Check not due until  03/04/22  and every 1  year(s)           High Blood Pressure Screen not due until  09/16/22  and every 1  year(s)           Tobacco Use Screen not due until  09/16/22  and every 1  year(s)           DM - Microalbumin not due until  09/24/22  and every 1  year(s)           Lipid Disorders Screen not due until  09/24/22  and every 1  year(s)           Type 2 Diabetes Mellitus Screen not due until  09/24/22  and every 1  year(s)     Satisfied (in the past 1 year)        " Satisfied            Body Mass Index Check on  03/04/21.           DM - HgbA1c on  09/24/21.           DM - HgbA1c on  02/25/21.           DM - Microalbumin on  09/24/21.           DM - Microalbumin on  09/24/21.           High Blood Pressure Screen on  09/16/21.           High Blood Pressure Screen on  03/04/21.           Lipid Disorders Screen on  09/24/21.           Lipid Disorders Screen on  09/24/21.           Lipid Disorders Screen on  09/24/21.           Lipid Disorders Screen on  09/24/21.           Lipid Disorders Screen on  02/25/21.           Lipid Disorders Screen on  02/25/21.           Lipid Disorders Screen on  02/25/21.           Lipid Disorders Screen on  02/25/21.           Tobacco Use Screen on  09/16/21.           Tobacco Use Screen on  03/04/21.           Type 2 Diabetes Mellitus Screen on  09/24/21.           Type 2 Diabetes Mellitus Screen on  02/25/21.

## 2022-02-15 NOTE — LETTER
(Inserted Image. Unable to display)   January 17, 2020      DAVID SULLIVAN      [address] [place], [place] [id number]        Dear ,    Thank you for selecting [facility] for your healthcare needs.  Below you will find the results of the recent tests done at our clinic.     All labs acceptable.      Result Name Current Result Previous Result Reference Range   Hgb A1c ((H)) 6.8 1/16/2020 ((H)) 8.4 10/4/2019  - <5.7       Please contact me or my assistant at [phone] if you have any questions.     Sincerely,        Victor M Gibbs MD        What do your labs mean?  Below is a glossary of commonly ordered labs:  LDL   Bad Cholesterol   HDL   Good Cholesterol  AST/ALT   Liver Function   Cr/Creatinine   Kidney Function  Microalbumin   Kidney Function  BUN   Kidney Function  PSA   Prostate    TSH   Thyroid Hormone  HgbA1c   Diabetes Test   Hgb (Hemoglobin)   Red Blood Cells

## 2022-02-15 NOTE — PROGRESS NOTES
Chief Complaint    Patient here to get updated on CPAP  and CPAP equipment  History of Present Illness      Patient diagnosed with severe obstructive sleep apnea he is on AutoPap but is essentially not used it.  He tends to fall asleep in the early evening in his chair now that he is retired and does not put on when he gets to bed.  His wife has sleep apnea uses her device every night but sleeps in a different room than he.  He has daytime sleepiness and snoring.  He is motivated to try using his CPAP again.  He is not happy with the mass to suggest an appointment with his provider to get refitted.  Review of Systems      Nocturia once per night.  No headache or heartburn.  Physical Exam   Vitals & Measurements    T: 97.8   F (Tympanic)  HR: 76(Peripheral)  BP: 132/70  SpO2: 97%     HT: 67.5 in       Patient is an overweight but otherwise healthy-appearing man in no distress.  Alert and oriented.  Assessment/Plan       Obstructive sleep apnea syndrome, moderate (G47.33)         Encouraged the patient to use his AutoPap download in 2 months with follow-up at that time.         Ordered:          [id number] office outpatient visit 15 minutes (Charge), Quantity: 1, Obstructive sleep apnea syndrome, moderate                Orders:         Return to Clinic (Request), RFV: Sleep apnea compliance visit., Return in 2 months  Patient Information     Name:DAVID SULLIVAN      Address:      [address] [place] [address]       [place] [address]     Sex:Male     YOB: 1950     [phone]     Emergency Contact:BRAYDEN SULLIVAN     MRN:[id number]     FIN:[id number]     Location:[facility]     Date of Service:10/03/2019      Primary Care Physician:       Victor M Gibbs MD, [phone]      Attending Physician:       Emmett Langford MD, [phone]  Problem List/Past Medical History    Ongoing     BPH associated with nocturia     Diabetes mellitus, type II     History of chicken pox     History of colon  "polyps     Hypertriglyceridemia     Morbid obesity     Obstructive sleep apnea syndrome, moderate       Comments: HST 10/7/18 with AHI 28 and oximetry lamine 73%    Historical     History of fracture of hand       Comments: Boxer's, right.  Procedure/Surgical History     Polysomnogram (10/29/2018)      Comments: AHI: 25.5.     Diabetic retinal eye exam (10/19/2017)      Comments: No Diabetic retinopathy.  Myopia of both eyes with astigmatism and presbyopia.     Colonoscopy (08/20/2015)      Comments: Indication:  history of polyps.      Sedation:  Midazolam 2 mg, Fentanyl 100 mcg.      Impression:  Diverticulosis in sigmoid colon.  Exam otherwise normal.        Recommendation:  Repeat in 5 years..     Colonoscopy (11/17/2008)      Comments: Indication:  History of polyps.      Sedation:  Midazolam 2 mg, Fentanyl 100 mcg.      Impression:  Two 3 mm polyps in recto-sigmoid colon..     Colonoscopy (2003)     ganglion cyst removal     sebaceous cyst removal     Tonsillectomy  Medications    accu check fast clix lancets, See Instructions, 1 refills    accu check smart view test strips, See Instructions, 3 refills    atorvastatin 10 mg oral tablet, 10 mg= 1 tab(s), Oral, qhs, 1 refills    tamsulosin 0.4 mg oral capsule, 0.4 mg= 1 cap(s), Oral, daily, 1 refills  Allergies    No Known Medication Allergies  Social History    Smoking Status - 10/03/2019     Former smoker     Alcohol      Past, 04/18/2017     Employment/School      Retired, Work/School description: [employer] (Part time)., 04/18/2017     Exercise      Exercise frequency: \"Activities at work 5x/week\"., 04/18/2017     Home/Environment      Marital status: . Spouse/Partner name: Romina Arboleda. Lives with Spouse. 1 children., 04/18/2017     Nutrition/Health      Type of diet: Regular., 03/12/2013     Sexual      Sexually active: Yes. Sexual orientation: Heterosexual., 03/12/2013     Substance Abuse      Never, " 04/18/2017     Tobacco      Former smoker, 04/18/2017  Family History    Breast cancer: Sister.    CA - Cancer of colon: Father.    CAD - Coronary artery disease: Mother.    Diabetes mellitus: Uncle (M).    Prostate carcinoma: Brother.  Immunizations      Vaccine Date Status      pneumococcal (PPSV23) 06/20/2017 Given      tetanus/diphth/pertuss (Tdap) adult/adol 09/27/2012 Given      Td 04/13/2003 Recorded

## 2022-02-15 NOTE — LETTER
(Inserted Image. Unable to display)         April 05, 2021      DAVID SULLIVAN  [address] [place] [address]          Dear ,      Thank you for selecting [facility] for your healthcare needs.    Our records indicate you are due for the following services:     Clinical Support Staff (CSS)-Only Blood Pressure Check ~ Please stop in anytime to have your blood pressure rechecked. This is a free service and no appointment necessary.  So we can best determine if your medications are effective in lowering your blood pressure, please make sure your blood pressure medicine has been in your system for at least 1-2 hours prior to coming in.  We encourage you to avoid caffeine or other stimulants prior to having your blood pressure checked and come at a time when you are not feeling rushed.   If you check your blood pressure at home, please bring in your blood pressure monitor and home blood pressure readings.  We will check your machine for accuracy and also share your home readings with your Healthcare Provider.    Non-Fasting Labs.    If you had your labs done at another facility or with Direct Access Lab Testing at [facility], please bring in a copy of the results to your next visit, mail a copy, or drop off a copy of your results to your Healthcare Provider.     (FYI   Regarding office visits: In some instances, a video visit or telephone visit may be offered as an option.)      To schedule an appointment or if you have further questions, please contact your clinic at [phone].      Powered by [employer]    Sincerely,    Victor M Gibbs MD

## 2022-02-15 NOTE — LETTER
(Inserted Image. Unable to display)   September 07, 2021  DAVID SULLIVAN  [address] [place] [address]          Dear ,      Thank you for selecting [facility] for your healthcare needs.    Our records indicate you are due for the following services:    Diabetic Exam ~ Please bring your glucose meter and/or your blood glucose diary to your appointment.  Hypertension check ~ please remember to bring your at-home blood pressure readings with you to your appointment.   Non-Fasting Labs    If you had your labs done at another facility or with Direct Access Lab Testing at [facility], please bring in a copy of the results to your next visit, mail a copy, or drop off a copy of your results to your Healthcare Provider.    You are due for lab work and an office visit; please schedule the lab appointment 1 week before the office visit.  This will assure all results are available to discuss with your Healthcare Provider during your visit.    (FYI   Regarding office visits: In some instances, a video visit or telephone visit may be offered as an option.)      **It is very helpful if you bring your medication bottles to your appointment.  This assures we have all of your current medications, including strength and dosing information, documented accurately in your medical record.    To schedule an appointment or if you have further questions, please contact your clinic at [phone].         Powered by [employer]    Sincerely,    Victor M Gibbs M.D.

## 2022-02-15 NOTE — TELEPHONE ENCOUNTER
---------------------  From: Wanda NINA, Rosy SULTANA (Phone Messages Pool ([id number]_[facility]))   To: Jermaine Ramírez MD;     Sent: 11/10/2021 5:14:45 PM CST  Subject: Family call     Phone message    PCP:   MALENA     Time of Call:  _       Person Calling:  Romina wife  Phone number:  [phone]        Note:   Pt at urgent care in [place] with COVID  Family wants him moved up this way.  She would like a call from [facility] in the AM tomorrow.Pt's wife calling stating that her  has been transferred to the ICU and she would like him to be closer to home.  Wife would like to know if there are any hospital beds in the area or how she would go about that.Called and spoke with wife who is herself recuperating from COVID. She notes that a friend who is a nurse had mentioned the idea of transferring him. Patient is currently in the ICU on high flow oxygen. Discussed that it would likely be difficult to find an ICU bed close to home and that I have concerns about transferring him in an ambulance for hours while he needs ICU care. If prolonged rehab is needed, then we could help them find phone numbers for hospitals closer to home.

## 2022-02-15 NOTE — LETTER
(Inserted Image. Unable to display)   October 06, 2019      DAVID SULLIVAN      [address] [place] [address] [address] [place], [place] [id number]        Dear ,    Thank you for selecting [facility] for your healthcare needs.  Below you will find the results of the recent tests done at our clinic.     Recheck in 3 months.      Result Name Current Result Previous Result Reference Range   Hgb A1c ((H)) 8.4 10/4/2019 ((H)) 7.2 5/13/2019  - <5.7       Please contact me or my assistant at [phone] if you have any questions.     Sincerely,        Victor M Gibbs MD        What do your labs mean?  Below is a glossary of commonly ordered labs:  LDL   Bad Cholesterol   HDL   Good Cholesterol  AST/ALT   Liver Function   Cr/Creatinine   Kidney Function  Microalbumin   Kidney Function  BUN   Kidney Function  PSA   Prostate    TSH   Thyroid Hormone  HgbA1c   Diabetes Test   Hgb (Hemoglobin)   Red Blood Cells

## 2022-02-15 NOTE — LETTER
(Inserted Image. Unable to display)   [address] [place] [address] [phone] (phone) [phone] (fax)  September 27, 2021      DAIVD SULLIVAN      [address] [place] [address]      Dear ,    Thank you for selecting [facility] for your healthcare needs. Below you will find the results of the recent tests done at our clinic.     Your lab results are listed below.    Cholesterol levels have increased since prior check. A1c has improved.     We should repeat fasting labs again in 6 months with your diabetes checkup.    Your COVID-19 antibodies are negative. This is consistent with NOT having made antibodies to COVID-19 from a prior infection. We do not yet know how often people make antibodies to prior infections. Based on these results, we would suspect that you could still become infected with COVID-19.    Please let me know if you have questions.     Result Name Current Result Previous Result Reference Range   Coronavirus (COVID-19) Ab IgG  <1.00 9/24/2021   - <1.00   Cholesterol (mg/dL)  188 9/24/2021  133 2/25/2021  - <200   HDL (mg/dL) ((L)) 33 9/24/2021 ((L)) 32 2/25/2021 > OR = 40 -    Triglyceride (mg/dL) ((H)) 361 9/24/2021 ((H)) 263 2/25/2021  - <150   LDL ((H)) 107 9/24/2021  66 2/25/2021    Cholesterol/HDL Ratio ((H)) 5.7 9/24/2021  4.2 2/25/2021  - <5.0   Non-HDL Cholesterol ((H)) 155 9/24/2021  101 2/25/2021  - <130   U Creatinine (mg/dL)  145 9/24/2021  20 - 320   U Microalbumin (mg/dL)  0.4 9/24/2021  0.7 6/4/2020 See Note: -    Ur Microalbumin/Creatinine Ratio  3 9/24/2021  5 6/4/2020  - <30   Glucose Level (mg/dL) ((H)) 157 9/24/2021 ((H)) 180 2/25/2021 65 - 99   BUN (mg/dL)  18 9/24/2021  17 2/25/2021 7 - 25   Creatinine Level (mg/dL)  1.09 9/24/2021  1.01 2/25/2021 0.70 - 1.25   eGFR (mL/min/1.73m2)  69 9/24/2021  76 2/25/2021 > OR = 60 -    eGFR  (mL/min/1.73m2)  80 9/24/2021  88  2/25/2021 > OR = 60 -    BUN/Creat Ratio  NOT APPLICABLE 9/24/2021  NOT APPLICABLE 2/25/2021 6 - 22   Sodium Level (mmol/L)  137 9/24/2021  139 2/25/2021 135 - 146   Potassium Level (mmol/L)  4.1 9/24/2021  4.1 2/25/2021 3.5 - 5.3   Chloride Level (mmol/L)  102 9/24/2021  103 2/25/2021 98 - 110   CO2 Level (mmol/L)  27 9/24/2021  27 2/25/2021 20 - 32   Calcium Level (mg/dL)  9.7 9/24/2021  9.2 2/25/2021 8.6 - 10.3   Hgb A1c ((H)) 7.3 9/24/2021 ((H)) 7.9 2/25/2021  - <5.7       Please contact me or my assistant at [phone] if you have any questions or concerns.     Sincerely,        Jermaine Ramírez M.D.